# Patient Record
Sex: MALE | Race: WHITE | Employment: FULL TIME | URBAN - METROPOLITAN AREA
[De-identification: names, ages, dates, MRNs, and addresses within clinical notes are randomized per-mention and may not be internally consistent; named-entity substitution may affect disease eponyms.]

---

## 2017-03-20 ENCOUNTER — HOSPITAL ENCOUNTER (EMERGENCY)
Age: 42
Discharge: PSYCHIATRIC OR SUBSTANCE USE FACILITY | End: 2017-03-21
Attending: EMERGENCY MEDICINE | Admitting: EMERGENCY MEDICINE
Payer: COMMERCIAL

## 2017-03-20 DIAGNOSIS — F10.929 ALCOHOL INTOXICATION, WITH UNSPECIFIED COMPLICATION (HCC): ICD-10-CM

## 2017-03-20 DIAGNOSIS — T42.4X2A OVERDOSE OF BENZODIAZEPINE, INTENTIONAL SELF-HARM, INITIAL ENCOUNTER (HCC): Primary | ICD-10-CM

## 2017-03-20 LAB
ALBUMIN SERPL BCP-MCNC: 4.4 G/DL (ref 3.4–5)
ALBUMIN/GLOB SERPL: 1.4 {RATIO} (ref 0.8–1.7)
ALP SERPL-CCNC: 79 U/L (ref 45–117)
ALT SERPL-CCNC: 45 U/L (ref 16–61)
AMPHET UR QL SCN: NEGATIVE
ANION GAP BLD CALC-SCNC: 7 MMOL/L (ref 3–18)
APAP SERPL-MCNC: <2 UG/ML (ref 10–30)
AST SERPL W P-5'-P-CCNC: 27 U/L (ref 15–37)
BARBITURATES UR QL SCN: NEGATIVE
BASOPHILS # BLD AUTO: 0.1 K/UL (ref 0–0.06)
BASOPHILS # BLD: 1 % (ref 0–2)
BENZODIAZ UR QL: POSITIVE
BILIRUB SERPL-MCNC: 0.3 MG/DL (ref 0.2–1)
BUN SERPL-MCNC: 12 MG/DL (ref 7–18)
BUN/CREAT SERPL: 10 (ref 12–20)
CALCIUM SERPL-MCNC: 9 MG/DL (ref 8.5–10.1)
CANNABINOIDS UR QL SCN: NEGATIVE
CHLORIDE SERPL-SCNC: 107 MMOL/L (ref 100–108)
CO2 SERPL-SCNC: 28 MMOL/L (ref 21–32)
COCAINE UR QL SCN: NEGATIVE
CREAT SERPL-MCNC: 1.19 MG/DL (ref 0.6–1.3)
DIFFERENTIAL METHOD BLD: ABNORMAL
EOSINOPHIL # BLD: 0.5 K/UL (ref 0–0.4)
EOSINOPHIL NFR BLD: 7 % (ref 0–5)
ERYTHROCYTE [DISTWIDTH] IN BLOOD BY AUTOMATED COUNT: 12.5 % (ref 11.6–14.5)
ETHANOL SERPL-MCNC: 192 MG/DL (ref 0–3)
GLOBULIN SER CALC-MCNC: 3.2 G/DL (ref 2–4)
GLUCOSE SERPL-MCNC: 83 MG/DL (ref 74–99)
HCT VFR BLD AUTO: 48 % (ref 36–48)
HDSCOM,HDSCOM: ABNORMAL
HGB BLD-MCNC: 16.2 G/DL (ref 13–16)
LYMPHOCYTES # BLD AUTO: 43 % (ref 21–52)
LYMPHOCYTES # BLD: 3.1 K/UL (ref 0.9–3.6)
MCH RBC QN AUTO: 30 PG (ref 24–34)
MCHC RBC AUTO-ENTMCNC: 33.8 G/DL (ref 31–37)
MCV RBC AUTO: 88.9 FL (ref 74–97)
METHADONE UR QL: NEGATIVE
MONOCYTES # BLD: 0.4 K/UL (ref 0.05–1.2)
MONOCYTES NFR BLD AUTO: 5 % (ref 3–10)
NEUTS SEG # BLD: 3.2 K/UL (ref 1.8–8)
NEUTS SEG NFR BLD AUTO: 44 % (ref 40–73)
OPIATES UR QL: NEGATIVE
PCP UR QL: NEGATIVE
PLATELET # BLD AUTO: 298 K/UL (ref 135–420)
PMV BLD AUTO: 10.2 FL (ref 9.2–11.8)
POTASSIUM SERPL-SCNC: 4.4 MMOL/L (ref 3.5–5.5)
PROT SERPL-MCNC: 7.6 G/DL (ref 6.4–8.2)
RBC # BLD AUTO: 5.4 M/UL (ref 4.7–5.5)
SALICYLATES SERPL-MCNC: <2.8 MG/DL (ref 2.8–20)
SODIUM SERPL-SCNC: 142 MMOL/L (ref 136–145)
WBC # BLD AUTO: 7.3 K/UL (ref 4.6–13.2)

## 2017-03-20 PROCEDURE — 80307 DRUG TEST PRSMV CHEM ANLYZR: CPT | Performed by: EMERGENCY MEDICINE

## 2017-03-20 PROCEDURE — 93005 ELECTROCARDIOGRAM TRACING: CPT

## 2017-03-20 PROCEDURE — 96360 HYDRATION IV INFUSION INIT: CPT

## 2017-03-20 PROCEDURE — 74011250636 HC RX REV CODE- 250/636: Performed by: EMERGENCY MEDICINE

## 2017-03-20 PROCEDURE — 80053 COMPREHEN METABOLIC PANEL: CPT | Performed by: EMERGENCY MEDICINE

## 2017-03-20 PROCEDURE — 99285 EMERGENCY DEPT VISIT HI MDM: CPT

## 2017-03-20 PROCEDURE — 96361 HYDRATE IV INFUSION ADD-ON: CPT

## 2017-03-20 PROCEDURE — 85025 COMPLETE CBC W/AUTO DIFF WBC: CPT | Performed by: EMERGENCY MEDICINE

## 2017-03-20 RX ADMIN — SODIUM CHLORIDE 2000 ML: 900 INJECTION, SOLUTION INTRAVENOUS at 22:27

## 2017-03-20 RX ADMIN — SODIUM CHLORIDE 1000 ML: 900 INJECTION, SOLUTION INTRAVENOUS at 20:51

## 2017-03-20 NOTE — ED TRIAGE NOTES
[facility] Security wouldn't let him onto the gate. Patient is intoxicated and took a bottle full of xanax. Patient stated that he had a fight with his wife that led to this.

## 2017-03-20 NOTE — ED PROVIDER NOTES
HPI Comments: Mor Alfaro is a 46 y.o. Male who self reports taking od of xanax earlier at unknown time for undisclosed reason. Noted to be altered by ems. 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 Seen last year for violent outburst after alcohol intoxication. Pt not providing any additional information at this time    The history is provided by the patient and the EMS personnel. The history is limited by the condition of the patient. Past Medical History:   Diagnosis Date    Anxiety     Asthma        Past Surgical History:   Procedure Laterality Date    HX APPENDECTOMY      HX HEENT      tonsillectomy    HX HERNIA REPAIR           Family History:   Problem Relation Age of Onset    Heart Disease Maternal Grandmother        Social History     Social History    Marital status:      Spouse name: N/A    Number of children: N/A    Years of education: N/A     Occupational History    Not on file. Social History Main Topics    Smoking status: Former Smoker     Quit date: 8/11/2013    Smokeless tobacco: Never Used    Alcohol use 42.0 oz/week     70 Glasses of wine per week      Comment: Pt verbalized he is a functoning alcoholic     Drug use: No    Sexual activity: Yes     Partners: Female     Other Topics Concern    Not on file     Social History Narrative         ALLERGIES: Review of patient's allergies indicates no known allergies. Review of Systems   Unable to perform ROS: Mental status change       Vitals:    03/21/17 0345 03/21/17 0400 03/21/17 0415 03/21/17 0430   BP: 112/63 115/52 114/41 (!) 126/98   Pulse: 67 63 75 70   Resp: 12 10 15 10   SpO2: 94% 99% 100% 99%            Physical Exam   Constitutional: He is oriented to person, place, and time. Non-toxic appearance. He does not appear ill. No distress. HENT:   Head: Normocephalic and atraumatic.    Right Ear: External ear normal.   Left Ear: External ear normal.   Nose: Nose normal.   Mouth/Throat: Oropharynx is clear and moist. No oropharyngeal exudate. Eyes: Conjunctivae are normal.   Neck: Normal range of motion. Cardiovascular: Normal rate, regular rhythm, normal heart sounds and intact distal pulses. Pulmonary/Chest: Effort normal and breath sounds normal. No respiratory distress. Abdominal: Soft. There is no tenderness. Musculoskeletal: Normal range of motion. He exhibits no edema. Neurological: He is alert and oriented to person, place, and time. He has normal strength. No cranial nerve deficit (3-12). GCS eye subscore is 4. GCS verbal subscore is 5. GCS motor subscore is 6. Skin: Skin is warm and dry. He is not diaphoretic. Psychiatric:   Pt is calm, admit to self harm intent, but denies hallucinations. Speech is very sluggish, slow   Nursing note and vitals reviewed.        [facility]  ED Course       Procedures  Vitals:  Patient Vitals for the past 12 hrs:   Pulse Resp BP SpO2   03/21/17 0430 70 10 (!) 126/98 99 %   03/21/17 0415 75 15 114/41 100 %   03/21/17 0400 63 10 115/52 99 %   03/21/17 0345 67 12 112/63 94 %   03/21/17 0330 61 13 113/62 97 %   03/21/17 0315 68 14 128/61 100 %   03/21/17 0300 63 16 112/64 100 %   03/21/17 0245 66 20 116/54 100 %   03/21/17 0230 67 12 95/81 97 %   03/21/17 0215 66 15 - 100 %   03/21/17 0200 71 14 100/57 91 %   03/21/17 0145 64 13 95/46 100 %   03/21/17 0130 74 14 93/53 93 %   03/21/17 0115 75 14 105/59 96 %   03/21/17 0100 74 14 106/51 96 %   03/21/17 0045 74 14 106/53 95 %   03/21/17 0030 68 11 106/47 95 %   03/21/17 0015 71 15 105/62 94 %   03/20/17 2300 70 - 100/61 98 %   03/20/17 2218 69 16 96/54 96 %   03/20/17 2215 69 16 (!) 85/45 98 %   03/20/17 2200 73 17 97/53 96 %   03/20/17 2148 75 15 105/53 96 %   03/20/17 2145 75 15 (!) 78/38 94 %   03/20/17 2133 76 15 108/46 96 %   03/20/17 2130 76 16 109/53 95 %   03/20/17 2115 75 16 106/52 95 %   03/20/17 2100 76 16 105/52 95 %   03/20/17 2045 73 15 104/49 94 %   03/20/17 2030 72 12 114/63 97 %   03/20/17 2015 80 16 113/56 94 % 03/20/17 2000 82 16 116/61 94 %   03/20/17 1945 77 13 101/80 93 %         Medications ordered:   Medications   sodium chloride 0.9 % bolus infusion 1,000 mL (0 mL IntraVENous IV Completed 3/20/17 2227)   sodium chloride 0.9 % bolus infusion 2,000 mL (0 mL IntraVENous IV Completed 3/21/17 0006)         Lab findings:  Recent Results (from the past 12 hour(s))   EKG, 12 LEAD, INITIAL    Collection Time: 03/20/17  7:37 PM   Result Value Ref Range    Ventricular Rate 82 BPM    Atrial Rate 82 BPM    P-R Interval 176 ms    QRS Duration 92 ms    Q-T Interval 366 ms    QTC Calculation (Bezet) 427 ms    Calculated P Axis 44 degrees    Calculated R Axis -74 degrees    Calculated T Axis 25 degrees    Diagnosis       Normal sinus rhythm with sinus arrhythmia  Left axis deviation  Abnormal ECG  No previous ECGs available     CBC WITH AUTOMATED DIFF    Collection Time: 03/20/17  7:40 PM   Result Value Ref Range    WBC 7.3 4.6 - 13.2 K/uL    RBC 5.40 4.70 - 5.50 M/uL    HGB 16.2 (H) 13.0 - 16.0 g/dL    HCT 48.0 36.0 - 48.0 %    MCV 88.9 74.0 - 97.0 FL    MCH 30.0 24.0 - 34.0 PG    MCHC 33.8 31.0 - 37.0 g/dL    RDW 12.5 11.6 - 14.5 %    PLATELET 290 494 - 875 K/uL    MPV 10.2 9.2 - 11.8 FL    NEUTROPHILS 44 40 - 73 %    LYMPHOCYTES 43 21 - 52 %    MONOCYTES 5 3 - 10 %    EOSINOPHILS 7 (H) 0 - 5 %    BASOPHILS 1 0 - 2 %    ABS. NEUTROPHILS 3.2 1.8 - 8.0 K/UL    ABS. LYMPHOCYTES 3.1 0.9 - 3.6 K/UL    ABS. MONOCYTES 0.4 0.05 - 1.2 K/UL    ABS. EOSINOPHILS 0.5 (H) 0.0 - 0.4 K/UL    ABS.  BASOPHILS 0.1 (H) 0.0 - 0.06 K/UL    DF AUTOMATED     METABOLIC PANEL, COMPREHENSIVE    Collection Time: 03/20/17  7:40 PM   Result Value Ref Range    Sodium 142 136 - 145 mmol/L    Potassium 4.4 3.5 - 5.5 mmol/L    Chloride 107 100 - 108 mmol/L    CO2 28 21 - 32 mmol/L    Anion gap 7 3.0 - 18 mmol/L    Glucose 83 74 - 99 mg/dL    BUN 12 7.0 - 18 MG/DL    Creatinine 1.19 0.6 - 1.3 MG/DL    BUN/Creatinine ratio 10 (L) 12 - 20      GFR est AA >60 >60 ml/min/1.73m2    GFR est non-AA >60 >60 ml/min/1.73m2    Calcium 9.0 8.5 - 10.1 MG/DL    Bilirubin, total 0.3 0.2 - 1.0 MG/DL    ALT (SGPT) 45 16 - 61 U/L    AST (SGOT) 27 15 - 37 U/L    Alk. phosphatase 79 45 - 117 U/L    Protein, total 7.6 6.4 - 8.2 g/dL    Albumin 4.4 3.4 - 5.0 g/dL    Globulin 3.2 2.0 - 4.0 g/dL    A-G Ratio 1.4 0.8 - 1.7     ETHYL ALCOHOL    Collection Time: 03/20/17  7:40 PM   Result Value Ref Range    ALCOHOL(ETHYL),SERUM 192 (H) 0 - 3 MG/DL   ACETAMINOPHEN    Collection Time: 03/20/17  7:40 PM   Result Value Ref Range    ACETAMINOPHEN <2 (L) 10 - 30 ug/mL   SALICYLATE    Collection Time: 03/20/17  7:40 PM   Result Value Ref Range    SALICYLATE <9.6 (L) 2.8 - 20.0 MG/DL   DRUG SCREEN, URINE    Collection Time: 03/20/17  8:23 PM   Result Value Ref Range    BENZODIAZEPINE POSITIVE (A) NEG      BARBITURATES NEGATIVE  NEG      THC (TH-CANNABINOL) NEGATIVE  NEG      OPIATES NEGATIVE  NEG      PCP(PHENCYCLIDINE) NEGATIVE  NEG      COCAINE NEGATIVE  NEG      AMPHETAMINE NEGATIVE  NEG      METHADONE NEGATIVE       HDSCOM (NOTE)        EKG interpretation by ED Physician:  nsr with no acute st tw changes; Rate 82, qtc 427  No previous    Cardiac monitor: normal sinus.  No ectopy    X-Ray, CT or other radiology findings or impressions:  No orders to display       Progress notes, Consult notes or additional Procedure notes:   2:40 AM  More awake, but still somewhat sluggish  5:55 AM  Seen by telepsych, dr Sergio Brown who pt tells me rec admission, and pt is voluntary  D/w Richard Webber from [facility] who review chart    ED Critical Care Note    System at risk for life threatening failure: cardiac, renal, pulm, neuro  Associated problems: hypotension, alcohol intox, overdose    Critical Care services provided: bedside management, consult, documentation  Excluded procedures (time not included in critical care): ecg interp    Total Critical Care Time (in minutes) 38    Have made dr Vidal Cash aware pt is pending placement and my conversation with [facility]        Reevaluation of patient:   Stable  No acute medical condition that would prevent transfer to mental health facility    Disposition:  Diagnosis:   1. Overdose of benzodiazepine, intentional self-harm, initial encounter ([facility]    2. Alcohol intoxication, with unspecified complication ([facility]        Disposition: pending placement    Follow-up Information     None            Patient's Medications   Start Taking    No medications on file   Continue Taking    CYCLOBENZAPRINE (FLEXERIL) 10 MG TABLET    Take 1 Tab by mouth three (3) times daily as needed for Muscle Spasm(s). IBUPROFEN (MOTRIN) 800 MG TABLET    Take 1 Tab by mouth every six (6) hours as needed for Pain. MAGNESIUM GLUCONATE 500 MG (27 MG  ELEMENTAL) TABLET    Take  by mouth two (2) times a day. MULTIVITS/IRON FUM/FA/D3/LYCOP (MULTI FOR HIM PO)    Take  by mouth. SILDENAFIL CITRATE (VIAGRA) 50 MG TABLET    Take 1 Tab by mouth as needed. VILAZODONE (VIIBRYD) 40 MG TAB TABLET    Take  by mouth daily.    These Medications have changed    No medications on file   Stop Taking    No medications on file     s

## 2017-03-21 ENCOUNTER — HOSPITAL ENCOUNTER (INPATIENT)
Age: 42
LOS: 3 days | Discharge: HOME | DRG: 885 | End: 2017-03-24
Attending: PSYCHIATRY & NEUROLOGY | Admitting: PSYCHIATRY & NEUROLOGY
Payer: COMMERCIAL

## 2017-03-21 VITALS
OXYGEN SATURATION: 100 % | SYSTOLIC BLOOD PRESSURE: 112 MMHG | DIASTOLIC BLOOD PRESSURE: 71 MMHG | RESPIRATION RATE: 15 BRPM | HEART RATE: 62 BPM | TEMPERATURE: 97.2 F

## 2017-03-21 PROBLEM — F32.A DEPRESSION: Status: ACTIVE | Noted: 2017-03-21

## 2017-03-21 LAB
ATRIAL RATE: 82 BPM
CALCULATED P AXIS, ECG09: 44 DEGREES
CALCULATED R AXIS, ECG10: -74 DEGREES
CALCULATED T AXIS, ECG11: 25 DEGREES
DIAGNOSIS, 93000: NORMAL
P-R INTERVAL, ECG05: 176 MS
Q-T INTERVAL, ECG07: 366 MS
QRS DURATION, ECG06: 92 MS
QTC CALCULATION (BEZET), ECG08: 427 MS
VENTRICULAR RATE, ECG03: 82 BPM

## 2017-03-21 PROCEDURE — 65220000005 HC RM SEMIPRIVATE PSYCH 3 OR 4 BED

## 2017-03-21 PROCEDURE — 74011250637 HC RX REV CODE- 250/637: Performed by: PSYCHIATRY & NEUROLOGY

## 2017-03-21 RX ORDER — HALOPERIDOL 5 MG/ML
5 INJECTION INTRAMUSCULAR
Status: DISCONTINUED | OUTPATIENT
Start: 2017-03-21 | End: 2017-03-24 | Stop reason: HOSPADM

## 2017-03-21 RX ORDER — LORAZEPAM 1 MG/1
1-2 TABLET ORAL
Status: DISCONTINUED | OUTPATIENT
Start: 2017-03-21 | End: 2017-03-24 | Stop reason: HOSPADM

## 2017-03-21 RX ORDER — HALOPERIDOL 5 MG/1
5 TABLET ORAL
Status: DISCONTINUED | OUTPATIENT
Start: 2017-03-21 | End: 2017-03-24 | Stop reason: HOSPADM

## 2017-03-21 RX ORDER — ALPRAZOLAM 0.5 MG/1
0.5 TABLET ORAL
COMMUNITY
End: 2017-03-24

## 2017-03-21 RX ORDER — BENZTROPINE MESYLATE 1 MG/ML
1 INJECTION INTRAMUSCULAR; INTRAVENOUS
Status: DISCONTINUED | OUTPATIENT
Start: 2017-03-21 | End: 2017-03-24 | Stop reason: HOSPADM

## 2017-03-21 RX ORDER — TRAZODONE HYDROCHLORIDE 50 MG/1
50 TABLET ORAL
Status: DISCONTINUED | OUTPATIENT
Start: 2017-03-21 | End: 2017-03-22

## 2017-03-21 RX ORDER — LORAZEPAM 2 MG/ML
1-2 INJECTION INTRAMUSCULAR
Status: DISCONTINUED | OUTPATIENT
Start: 2017-03-21 | End: 2017-03-24 | Stop reason: HOSPADM

## 2017-03-21 RX ORDER — BENZTROPINE MESYLATE 1 MG/1
1 TABLET ORAL
Status: DISCONTINUED | OUTPATIENT
Start: 2017-03-21 | End: 2017-03-24 | Stop reason: HOSPADM

## 2017-03-21 RX ORDER — NALTREXONE HYDROCHLORIDE 50 MG/1
25 TABLET, FILM COATED ORAL DAILY
COMMUNITY
End: 2017-03-24

## 2017-03-21 RX ADMIN — LORAZEPAM 1 MG: 1 TABLET ORAL at 16:26

## 2017-03-21 RX ADMIN — TRAZODONE HYDROCHLORIDE 50 MG: 50 TABLET ORAL at 20:01

## 2017-03-21 NOTE — BH NOTES
Patient arrived on the unit from [place]. Patient was at [facility] ED after he was found attempting to OD on 30-45 0.5mg Xanax per patient. Patient states that he was fighting with his wife on the phone about money and he got upset and started drinking and then took about half a bottle of Xanax. Patient denies SI/HI and A/V/H. Patient states that he wasn't trying to kill himself and he wasn't attention seeking. Patient states \"if I need to put a label on it it would be self-destructive\". Patient has been oriented to the unit and given lunch. Patient is calm and cooperative but flat.

## 2017-03-21 NOTE — ED NOTES
Verbal shift change report given to Jerry Coleman RN (oncoming nurse) by Jeniffer Garcia (offgoing nurse). Report included the following information SBAR, ED Summary and MAR.

## 2017-03-21 NOTE — ED NOTES
7:28 AM  Assumed care of patient chart and labs reviewed awaiting possible disposition to [facility]  Visit Vitals    /53    Pulse (!) 56    Resp 16    SpO2 100%     Pt alert no distress at present voluntary for admission     9:56 AM  Pt accepted to [facility]     Diagnosis:   1. Overdose of benzodiazepine, intentional self-harm, initial encounter ([facility]    2. Alcohol intoxication, with unspecified complication ([facility]          Disposition: transfer    Follow-up Information     None          Patient's Medications   Start Taking    No medications on file   Continue Taking    CYCLOBENZAPRINE (FLEXERIL) 10 MG TABLET    Take 1 Tab by mouth three (3) times daily as needed for Muscle Spasm(s). IBUPROFEN (MOTRIN) 800 MG TABLET    Take 1 Tab by mouth every six (6) hours as needed for Pain. MAGNESIUM GLUCONATE 500 MG (27 MG  ELEMENTAL) TABLET    Take  by mouth two (2) times a day. MULTIVITS/IRON FUM/FA/D3/LYCOP (MULTI FOR HIM PO)    Take  by mouth. SILDENAFIL CITRATE (VIAGRA) 50 MG TABLET    Take 1 Tab by mouth as needed. VILAZODONE (VIIBRYD) 40 MG TAB TABLET    Take  by mouth daily.    These Medications have changed    No medications on file   Stop Taking    No medications on file

## 2017-03-21 NOTE — ED NOTES
Pt seems more lethargic. Increased force with sternal rub needed to arouse patient.  Dr. Lawanda Walters informed

## 2017-03-21 NOTE — CONSULTS
History of Present Illness: Pt is a 45 yo male who reports a hx of Depression and Anxiety. Pt presents to the ED, s/p intentional overdose on approximately 30-45 tabs of Xanax 0.5mg tabs. 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 He was found in his care at the [facility]. Pt took the remaining contents of his bottle of Xanax approximately 30-45 tabs. Pt states he took the medication after an argument with his wife regarding money. States his intent was not to kill himself but to get up the courage to get himself fired from his job. States I hate my life I hate my job.   States he also needs to get away from his wife as well. 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    He reports he has been depressed for a long time and that his depression is worse. States his triggers are his wife and job. States has a prior hx of multiple suicide attempts and inpt psychiatric admissions. On ROS pt denies AVHs, delusions nor current SI/HI. Pt is s/p intentional overdose on a large amount of Xanax. Pt presents as a danger to herself and requires acute inpt psychiatric admission for safety, stabilization and treatment. Pt is voluntary for inpt treatment. Inpt  multiple prior inpt admissions  Outpt  reports compliance  SAttempts   prior hx of multiple attempts  Family Psych Hx  not reported  Social Hx  , employed full time    Medical History: none acute reported  Substance Use Hx: ETOH  Medications & Freq: see chart    Allergies: NKDA  Mental Status Exam:   Appearance and attire: Dressed in hospital attire  Attitude and behavior: cooperative  Affect and mood: depressed/constricted  Association and thought processes: linear  Thought content: Denies delusions. Denies current SI/HI.   S/p intentional overdose  Perceptual: Denies AVHs  Sensorium, memory, and orientation AxOx3  Insight and judgment: poor/Impaired  Diagnosis: Unspecified Depressive Disorder, Alcohol Use Disorder  Treatment Recommendations:  Pt requires acute inpt psychiatric admission  For safety, stabilization and treatment  Pt is voluntary for inpt treatment

## 2017-03-21 NOTE — PROGRESS NOTES
Pt accepted at [facility] and accepting psychiatrist is Dr Yashira Restrepo. Pt is going to Adult Unit Room 126 Bed 4. Call report to [phone]. ED MD, ED RN and pt made aware.

## 2017-03-21 NOTE — ED NOTES
Patient pulling at leads and attempting to get up out of bed. Patient cooperative and stated \"I'm not trying to be difficult. I am just bored\"  Patient given a blanket and the head of bed put down and the lights dimmed and patient stated that he would try to go to sleep.

## 2017-03-21 NOTE — ED NOTES
Report called to [facility] psych and given to Marianna Bowman RN using SBAR. Pt is going to room 126, bed 4. Accepting MD is Dr. Lizeth Sue.

## 2017-03-21 NOTE — ED NOTES
Asked patient more about his intentions when taking the xanax. Patient stated \"I was not trying to hurt myself because you can't overdose on xanax. I just wanted to take enough so that I would get fired from my job because I hate my job\"     Noted pt not suicidal at this time.

## 2017-03-21 NOTE — IP AVS SNAPSHOT
Current Discharge Medication List  
  
START taking these medications Dose & Instructions Dispensing Information Comments Morning Noon Evening Bedtime ARIPiprazole 10 mg tablet Commonly known as:  ABILIFY Your last dose was: Your next dose is:    
   
   
 Dose:  10 mg Take 1 Tab by mouth daily for 30 days. Indications: DEPRESSION TREATMENT ADJUNCT, MIXED BIPOLAR I DISORDER Quantity:  30 Tab Refills:  0  
     
   
   
   
  
 traZODone 50 mg tablet Commonly known as:  Jorge Osborne Your last dose was: Your next dose is:    
   
   
 Dose:  50 mg Take 1 Tab by mouth nightly for 30 days. Indications: insomnia associated with depression Quantity:  30 Tab Refills:  0 CONTINUE these medications which have CHANGED Dose & Instructions Dispensing Information Comments Morning Noon Evening Bedtime * VIIBRYD 40 mg Tab tablet Generic drug:  vilazodone What changed:  Another medication with the same name was added. Make sure you understand how and when to take each. Your last dose was: Your next dose is: Take  by mouth daily. Refills:  0  
     
   
   
   
  
 * vilazodone 40 mg Tab tablet Commonly known as:  VIIBRYD What changed: You were already taking a medication with the same name, and this prescription was added. Make sure you understand how and when to take each. Your last dose was: Your next dose is:    
   
   
 Dose:  40 mg Take 1 Tab by mouth daily for 30 days. Indications: major depressive disorder Quantity:  30 Tab Refills:  0  
     
   
   
   
  
 * Notice: This list has 2 medication(s) that are the same as other medications prescribed for you. Read the directions carefully, and ask your doctor or other care provider to review them with you. STOP taking these medications   
 cyclobenzaprine 10 mg tablet Commonly known as:  FLEXERIL  
   
  
 ibuprofen 800 mg tablet Commonly known as:  MOTRIN  
   
  
 magnesium gluconate 500 mg (27 mg  elemental) tablet MULTI FOR HIM PO  
   
  
 naltrexone 50 mg tablet Commonly known as:  DEPADE  
   
  
 sildenafil citrate 50 mg tablet Commonly known as:  VIAGRA  
   
  
 XANAX 0.5 mg tablet Generic drug:  ALPRAZolam  
   
  
  
  
Where to Get Your Medications Information on where to get these meds will be given to you by the nurse or doctor. ! Ask your nurse or doctor about these medications ARIPiprazole 10 mg tablet  
 traZODone 50 mg tablet  
 vilazodone 40 mg Tab tablet

## 2017-03-21 NOTE — IP AVS SNAPSHOT
Brent Roman 
 
 
 [address] [place] [address] Patient: Mateusz Acevedo MRN: [id number] MFY:[birth year]78 You are allergic to the following No active allergies Immunizations Administered for This Admission Name Date Influenza Vaccine (Quad) PF  Deferred () Recent Documentation Height Weight BMI Smoking Status 1.803 m 83.9 kg 25.8 kg/m2 Former Smoker Emergency Contacts Name Discharge Info Relation Home Work Mobile [address] [facility]  Parent [1] [phone] About your hospitalization You were admitted on:  March 21, 2017 You last received care in the:  [facility] 1 ADULT CHEM DEP You were discharged on:  March 24, 2017 Unit phone number:  [phone] Why you were hospitalized Your primary diagnosis was:  Not on File Your diagnoses also included:  Depression Providers Seen During Your Hospitalizations Provider Role Specialty Primary office phone Gómez Staton MD Attending Provider Psychiatry [phone] Your Primary Care Physician (PCP) Primary Care Physician Office Phone Office Fax Radha Keane [phone] Follow-up Information Follow up With Details Comments Contact Info Tiffany Walsh MD   [address] [facility] [address] [facility] 
[phone] [facility]  Follow up appointment at [address] [place] [address] with Dr. Dany Garcia on April 12, 2017 at 2:10 pm for Medication Management. [address] [place] [address] [address] [facility] [address] [facility] Current Discharge Medication List  
  
START taking these medications Dose & Instructions Dispensing Information Comments Morning Noon Evening Bedtime ARIPiprazole 10 mg tablet Commonly known as:  ABILIFY Your last dose was:     
   
Your next dose is:    
   
   
 Dose:  10 mg  
 Take 1 Tab by mouth daily for 30 days. Indications: DEPRESSION TREATMENT ADJUNCT, MIXED BIPOLAR I DISORDER Quantity:  30 Tab Refills:  0  
     
   
   
   
  
 traZODone 50 mg tablet Commonly known as:  [place] Your last dose was: Your next dose is:    
   
   
 Dose:  50 mg Take 1 Tab by mouth nightly for 30 days. Indications: insomnia associated with depression Quantity:  30 Tab Refills:  0 CONTINUE these medications which have CHANGED Dose & Instructions Dispensing Information Comments Morning Noon Evening Bedtime * VIIBRYD 40 mg Tab tablet Generic drug:  vilazodone What changed:  Another medication with the same name was added. Make sure you understand how and when to take each. Your last dose was: Your next dose is: Take  by mouth daily. Refills:  0  
     
   
   
   
  
 * vilazodone 40 mg Tab tablet Commonly known as:  VIIBRYD What changed: You were already taking a medication with the same name, and this prescription was added. Make sure you understand how and when to take each. Your last dose was: Your next dose is:    
   
   
 Dose:  40 mg Take 1 Tab by mouth daily for 30 days. Indications: major depressive disorder Quantity:  30 Tab Refills:  0  
     
   
   
   
  
 * Notice: This list has 2 medication(s) that are the same as other medications prescribed for you. Read the directions carefully, and ask your doctor or other care provider to review them with you. STOP taking these medications   
 cyclobenzaprine 10 mg tablet Commonly known as:  FLEXERIL  
   
  
 ibuprofen 800 mg tablet Commonly known as:  MOTRIN  
   
  
 magnesium gluconate 500 mg (27 mg  elemental) tablet MULTI FOR HIM PO  
   
  
 naltrexone 50 mg tablet Commonly known as:  DEPADE  
   
  
 sildenafil citrate 50 mg tablet Commonly known as:  VIAGRA  
   
  
 XANAX 0.5 mg tablet Generic drug:  ALPRAZolam  
   
  
  
  
Where to Get Your Medications Information on where to get these meds will be given to you by the nurse or doctor. ! Ask your nurse or doctor about these medications ARIPiprazole 10 mg tablet  
 traZODone 50 mg tablet  
 vilazodone 40 mg Tab tablet Discharge Instructions BEHAVIORAL HEALTH NURSING DISCHARGE NOTE Emergency Numbers [address] [facility] Desk: [phone] [facility]: [phone] Suicide Prevention Line: [phone] (TALK) The following personal items collected during your admission are returned to you:  
Dental Appliance: Dental Appliances: None Vision:   
Hearing Aid:   
Jewelry: Jewelry: Watch (blkplastic-on-self) Clothing: Clothing: Footwear, Pants, Shirt, Undergarments, With patient Other Valuables: Other Valuables: None Valuables sent to safe: Personal Items Sent to Safe:  (none) The discharge information has been reviewed with the patient. The patient verbalized understanding. Spock Activation Thank you for requesting access to Spock. Please follow the instructions below to securely access and download your online medical record. Spock allows you to send messages to your doctor, view your test results, renew your prescriptions, schedule appointments, and more. How Do I Sign Up? 1. In your internet browser, go to [email or web address] 
2. Click on the First Time User? Click Here link in the Sign In box. You will be redirect to the New Member Sign Up page. 3. Enter your Spock Access Code exactly as it appears below. You will not need to use this code after youve completed the sign-up process. If you do not sign up before the expiration date, you must request a new code. Spock Access Code: [email or web address] Expires: 2017  7:35 PM (This is the date your Spock access code will ) 4. Enter the last four digits of your Social Security Number (xxxx) and Date of Birth (mm/dd/yyyy) as indicated and click Submit. You will be taken to the next sign-up page. 5. Create a [facility] ID. This will be your [facility] login ID and cannot be changed, so think of one that is secure and easy to remember. 6. Create a [facility] password. You can change your password at any time. 7. Enter your Password Reset Question and Answer. This can be used at a later time if you forget your password. 8. Enter your e-mail address. You will receive e-mail notification when new information is available in [address] 9. Click Sign Up. You can now view and download portions of your medical record. 10. Click the Download Summary menu link to download a portable copy of your medical information. Additional Information If you have questions, please visit the Frequently Asked Questions section of the [facility] website at [email or web address] Remember, [facility] is NOT to be used for urgent needs. For medical emergencies, dial 911. Patient armband removed and shredded Discharge Orders None [facility] Announcement We are excited to announce that we are making your provider's discharge notes available to you in [facility]. You will see these notes when they are completed and signed by the physician that discharged you from your recent hospital stay. If you have any questions or concerns about any information you see in [facility], please call the Health Information Department where you were seen or reach out to your Primary Care Provider for more information about your plan of care. Introducing [facility]! Kalyn Boyer introduces [facility] patient portal. Now you can access parts of your medical record, email your doctor's office, and request medication refills online. 1. In your internet browser, go to [email or web address] 2. Click on the First Time User? Click Here link in the Sign In box. You will see the New Member Sign Up page. 3. Enter your [facility] Access Code exactly as it appears below. You will not need to use this code after youve completed the sign-up process. If you do not sign up before the expiration date, you must request a new code. · [facility] Access Code: [email or web address] Expires: 6/18/2017  7:35 PM 
 
4. Enter the last four digits of your Social Security Number (xxxx) and Date of Birth (mm/dd/yyyy) as indicated and click Submit. You will be taken to the next sign-up page. 5. Create a [facility] ID. This will be your [facility] login ID and cannot be changed, so think of one that is secure and easy to remember. 6. Create a [facility] password. You can change your password at any time. 7. Enter your Password Reset Question and Answer. This can be used at a later time if you forget your password. 8. Enter your e-mail address. You will receive e-mail notification when new information is available in [address] 9. Click Sign Up. You can now view and download portions of your medical record. 10. Click the Download Summary menu link to download a portable copy of your medical information. If you have questions, please visit the Frequently Asked Questions section of the [facility] website. Remember, [facility] is NOT to be used for urgent needs. For medical emergencies, dial 911. Now available from your iPhone and Android! General Information Please provide this summary of care documentation to your next provider. Patient Signature:  ____________________________________________________________ Date:  ____________________________________________________________  
  
Jayme Moulding Provider Signature:  ____________________________________________________________ Date:  ____________________________________________________________

## 2017-03-21 NOTE — ED NOTES
Pt keeps making attempts to come out of bed. Pt spilled urinal all over floor. Housekeeping called. Explained to patient that he needs to stay in bed.

## 2017-03-21 NOTE — ED NOTES
Pt continues to try to get up out of bed. Explained to pt he needs to stay in bed. Patient verbalized understanding.

## 2017-03-21 NOTE — H&P
History and Physical        Patient: Benny Ruth               Sex: male          DOA: 3/21/2017         YOB: 1975      Age:  46 y.o.        LOS:  LOS: 0 days        HPI:     Benny Ruth is a 46 y.o. male who was admitted experiencing depression and suicidal ideation after attempting to overdose on medication. Active Problems:    Depression (3/21/2017)        Past Medical History:   Diagnosis Date    Anxiety     Asthma        Past Surgical History:   Procedure Laterality Date    HX APPENDECTOMY      HX HEENT      tonsillectomy    HX HERNIA REPAIR         Family History   Problem Relation Age of Onset    Heart Disease Maternal Grandmother        Social History     Social History    Marital status:      Spouse name: N/A    Number of children: 1    Years of education: GED     Social History Main Topics    Smoking status: Former Smoker     Quit date: 8/11/2013    Smokeless tobacco: Never Used    Alcohol use 42.0 oz/week     70 Glasses of wine per week      Comment: 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 States his appetite and sleep have been okay. States he is unemployed. Prior to Admission medications    Medication Sig Start Date End Date Taking? Authorizing Provider   naltrexone (DEPADE) 50 mg tablet Take 25 mg by mouth daily. Indications: ALCOHOLISM, dose unknown   Yes Historical Provider   ALPRAZolam (XANAX) 0.5 mg tablet Take 0.5 mg by mouth three (3) times daily as needed for Anxiety. Yes Historical Provider   vilazodone (VIIBRYD) 40 mg tab tablet Take  by mouth daily. Yes Historical Provider   sildenafil citrate (VIAGRA) 50 mg tablet Take 1 Tab by mouth as needed.  12/7/16   Sandria Curling, MD   ibuprofen (MOTRIN) 800 mg tablet Take 1 Tab by mouth every six (6) hours as needed for Pain. 11/13/13   Donell Canas MD   cyclobenzaprine (FLEXERIL) 10 mg tablet Take 1 Tab by mouth three (3) times daily as needed for Muscle Spasm(s). 11/13/13   Donell Canas MD   magnesium gluconate 500 mg (27 mg  elemental) tablet Take  by mouth two (2) times a day. Historical Provider   MULTIVITS/IRON FUM/FA/D3/LYCOP (MULTI FOR HIM PO) Take  by mouth. Historical Provider       No Known Allergies    Review of Systems  A comprehensive review of systems was negative. Physical Exam:      Visit Vitals    /72 (BP 1 Location: Right arm, BP Patient Position: Sitting)    Pulse 69    Temp 96.8 °F (36 °C)    Resp 16    Ht 5' 11\" (1.803 m)    Wt 185 lb (83.9 kg)    BMI 25.8 kg/m2       Physical Exam:    General:  Alert, cooperative, well developed, well nourished  male,  no distress, appears stated age. Eyes:  Conjunctivae/corneas clear. PERRL, EOMs intact. Fundi benign   Ears:  Normal TMs and external ear canals both ears. Nose: Nares normal. Septum midline. Mucosa normal. No drainage or sinus tenderness. Mouth/Throat: Lips, mucosa, and tongue normal. Teeth and gums normal.   Neck: Supple, symmetrical, trachea midline, no adenopathy, thyroid: no enlargement/tenderness/nodules, no carotid bruit and no JVD. Back:   Symmetric, no curvature. ROM normal. No CVA tenderness. Lungs:   Clear to auscultation bilaterally. Heart:  Regular rate and rhythm, S1, S2 normal, no murmur, click, rub or gallop. Abdomen:   Soft, non-tender. Bowel sounds normal. No masses,  No organomegaly. Extremities: Extremities normal, atraumatic, no cyanosis or edema. Pulses: 2+ and symmetric all extremities. Skin: Skin color, texture, turgor normal. No rashes or lesions   Lymph nodes: Cervical, supraclavicular, and axillary nodes normal.   Neurologic: CNII-XII intact. Normal strength, sensation and reflexes throughout.            Assessment/Plan     Depression  Suicidal ideation  Labs reviewed  Continue treatment per physician's orders

## 2017-03-21 NOTE — BSMART NOTE
OCCUPATIONAL THERAPY PROGRESS NOTE  Group Time:  1500  Attendance: The patient attended full group. Participation:  The patient participated fully in the activity. Attention:  The patient was able to focus on the activity. Interaction:  The patient occasionally  interacts with others. Patient came willingly to group. He said during introductions that healthy nutrition was hard for him because of erratic schedules. Patient was able to identify lifestyle changes that could benefit him in the long run, but, argued some of the fine points in the discussion.

## 2017-03-21 NOTE — BH NOTES
ANDRES Admission Note: Pt. Arrived on the unit ambulating with an admission person with the above pt. Pt was checked for contraband upon admission on the unit. Pt was given a copy of the rules upon admission.

## 2017-03-22 PROCEDURE — 65220000005 HC RM SEMIPRIVATE PSYCH 3 OR 4 BED

## 2017-03-22 PROCEDURE — 74011250637 HC RX REV CODE- 250/637: Performed by: PSYCHIATRY & NEUROLOGY

## 2017-03-22 RX ORDER — IBUPROFEN 400 MG/1
800 TABLET ORAL
Status: DISCONTINUED | OUTPATIENT
Start: 2017-03-22 | End: 2017-03-24 | Stop reason: HOSPADM

## 2017-03-22 RX ORDER — ARIPIPRAZOLE 10 MG/1
10 TABLET ORAL DAILY
Status: DISCONTINUED | OUTPATIENT
Start: 2017-03-22 | End: 2017-03-24 | Stop reason: HOSPADM

## 2017-03-22 RX ORDER — VILAZODONE HYDROCHLORIDE 10 MG/1
40 TABLET ORAL DAILY
Status: DISCONTINUED | OUTPATIENT
Start: 2017-03-22 | End: 2017-03-24 | Stop reason: HOSPADM

## 2017-03-22 RX ORDER — TRAZODONE HYDROCHLORIDE 50 MG/1
50 TABLET ORAL
Status: DISCONTINUED | OUTPATIENT
Start: 2017-03-22 | End: 2017-03-24 | Stop reason: HOSPADM

## 2017-03-22 RX ADMIN — TRAZODONE HYDROCHLORIDE 50 MG: 50 TABLET ORAL at 20:17

## 2017-03-22 RX ADMIN — ARIPIPRAZOLE 10 MG: 10 TABLET ORAL at 11:51

## 2017-03-22 RX ADMIN — LORAZEPAM 1 MG: 1 TABLET ORAL at 14:37

## 2017-03-22 RX ADMIN — LORAZEPAM 1 MG: 1 TABLET ORAL at 19:17

## 2017-03-22 RX ADMIN — VILAZODONE HYDROCHLORIDE 40 MG: 10 TABLET ORAL at 11:51

## 2017-03-22 NOTE — BSMART NOTE
ART THERAPY GROUP PROGRESS NOTE    Late entry note:1315    PATIENT SCHEDULED FOR GROUP AT: 4467    ATTENDANCE: Full    PARTICIPATION LEVEL: Participates fully in the art process    ATTENTION LEVEL: Able to focus on task    FOCUS: Problem-solving/ identify emotions    SYMBOLIC & THEMATIC CONTENT AS NOTED IN IMAGERY: he was calm, compliant, and invested in the task at hand. He expressed some guilt while describing his actions that brought him to the hospital. He claimed that he \"should not have handled [his] anger by drinking,\" however placed much blame on other's for his actions, claiming \"people just ignite the fire (referring to his anger) and I'm left with the consequences. \" Group discussed the inability to control another's words or actions, but the ability to control one's own.

## 2017-03-22 NOTE — BH NOTES
GROUP THERAPY PROGRESS NOTE    Bud Quispe is participating in Stress Management. Group time: 30 minutes    Personal goal for participation: Stress Relief    Goal orientation: community    Group therapy participation: active    Therapeutic interventions reviewed and discussed: \"Hunting for the Good\"-Turning a negative situation into a positive. Impression of participation: Pt fully participated in group therapy. Pt stated that earlier during the day, his wife would not take phone calls from him, but now they engage in a conversation without yelling and screaming at each other. Pt states that she's a major part of why he's here at the facility, but he's learning how to release stress by exercising and not resorting to violence.

## 2017-03-22 NOTE — BH NOTES
Please find rounds paperwork in chart as per [facility] downtime. The documentation for this period is being entered following the guidelines as defined in the [facility] downtime policy by Swathi Cyr RN.

## 2017-03-22 NOTE — BH NOTES
GROUP THERAPY PROGRESS NOTE    Luciana Wilcox is participating in Recreational Therapy. Group time: 30 minutes    Personal goal for participation: fresh air break/games on the unit    Goal orientation: social    Group therapy participation: active    Therapeutic interventions reviewed and discussed: Staff encouraged Pt to get off the unit and go outside and get some fresh air, or play games on the unit with peers. Impression of participation: Pt chose to go off the unit to the recreation room, get fresh air, and playing games with peers.

## 2017-03-22 NOTE — BH NOTES
SW Contact:  Pt. Is a 46year old male with history of depression and anxiety. Pt. has previous psych admissions. Pt. Was admitted to this facility for alcohol intoxication a and overdose on Xanax. SW met with  pt. To discuss the reason for this admission. Pt stated he has been feeling depressed. Pt. admits to having martial issues and does not like his job. Pt. Stated he and his spouse had an argument over finances. Pt. Stated after the argument , he decided to drink. Pt stated he than attempted to drive thru the gate at work. Pt. Expressed he was hoping to get fired. SW allowed pt to event. Pt stated he and his spouse are currently going thru mediation. Pt stated he does not want a divorce. Pt. admits to having a lot of insecurities in his marriage. Pt. Admits drinking has been issue for him since age 17. Pt. Stated both parents were alcoholics. Pt admits he was doing well on medications. The medications  Helped to  deter the alcohol craving. The pt. Stated after the argument with his spouse, he decided to relapse. SW discussed relapse prevention such as IOP or  residential rehab, positive conflict resolution, self-efficacy, safety plan, positive thoughts to counteract negative thinking. Pt. Stated he receives outpt mental health services with [facility].  Pt. plans to return home after d/c.

## 2017-03-22 NOTE — BSMART NOTE
OCCUPATIONAL THERAPY PROGRESS NOTE  Group Time:  4573  Attendance: The patient attended full group. Participation:  The patient participated with moderate elaboration in the activity. Attention:  The patient was able to focus on the activity. Interaction:  The patient occasionally  interacts with others. Somewhat guarded and generalized in responses. Did discuss drinking and his tendency to have trouble when he does, states he has only had drink 3 times since January and feels he needs to stop drinking.

## 2017-03-22 NOTE — BH NOTES
Patient has been cooperative and pleasant during this nurse's shift. Patient has taken all medications as prescribed and on schedule and has not required PRN medications. Patient has eaten all meals and snacks and agrees to come to staff if feelings of self harm or harm to others arise. Patient attended groups and activities and was active participant. Patient consistently wears non-skid footwear while ambulating and room is free of clutter. Will continue to monitor and provide safe and therapeutic environment.

## 2017-03-22 NOTE — BH NOTES
GROUP THERAPY PROGRESS NOTE    Isabel Batres is participating in [place].      Group time: 20 minutes    Personal goal for participation: talk w/md    Goal orientation:community     Group therapy participation: active    Therapeutic interventions reviewed and discussed: unit rules and guidelines

## 2017-03-22 NOTE — H&P
BEHAVIORAL HEALTH ADMISSION NOTES    Patient: Aurelio Arellano               Sex: male          DOA: 3/21/2017       YOB: 1975      Age:  46 y.o. 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 Per chart patient reports prior SAs (last SA was 2/2016) and multiple inpatient psychiatric hospitalization (last admission was 2/2016). Per chart patient self-report that he intentionally OD on 30-40x of Xanax 0.5 mg with alcohol in order to kill himself. On this admission patient was admitted under volunteer status for worsening depression and post SA via medications. Patient stated he took the medication after an argument with his wife regarding money. He stated his intent was not to kill himself but to get enough courage to get himself fired from his job. He stated, I hate my life I hate my job.   And he also stated he needs to get away from his wife as well. 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   Patient reports that he wants to get fired so that his wife cant ask him for any more money. Patient denies having any A/V/H, delusion, or current SI/HI. Patient states that he wasn't trying to kill himself and he wasn't attention seeking. Patient states \"if I need to put a label on it, it would be self-destructive\". At present, patient reports having manic-depressive symptoms but NOT psychotic. He denies all neurovegetative symptoms. He is willing to comply with the recommended treatment plan in order to improve his psychiatric symptoms.      Active Problems:    Depression (3/21/2017)         Past Medical History:   Diagnosis Date    Anxiety     Asthma         Past Surgical History:   Procedure Laterality Date    HX APPENDECTOMY      HX HEENT      tonsillectomy    HX HERNIA REPAIR         Social History   Substance Use Topics    Smoking status: Former Smoker     Quit date: 8/11/2013    Smokeless tobacco: Never Used    Alcohol use 42.0 oz/week     70 Glasses of wine per week      Comment: Pt verbalized he is a functoning alcoholic         Family History   Problem Relation Age of Onset    Heart Disease Maternal Grandmother         No Known Allergies     Prior to Admission medications    Medication Sig Start Date End Date Taking? Authorizing Provider   naltrexone (DEPADE) 50 mg tablet Take 25 mg by mouth daily. Indications: ALCOHOLISM, dose unknown   Yes Historical Provider   ALPRAZolam (XANAX) 0.5 mg tablet Take 0.5 mg by mouth three (3) times daily as needed for Anxiety. Yes Historical Provider   vilazodone (VIIBRYD) 40 mg tab tablet Take  by mouth daily. Yes Historical Provider   sildenafil citrate (VIAGRA) 50 mg tablet Take 1 Tab by mouth as needed. 12/7/16   Yaron Preston MD   ibuprofen (MOTRIN) 800 mg tablet Take 1 Tab by mouth every six (6) hours as needed for Pain. 11/13/13   Yaron Preston MD   cyclobenzaprine (FLEXERIL) 10 mg tablet Take 1 Tab by mouth three (3) times daily as needed for Muscle Spasm(s). 11/13/13   Yaron Preston MD   magnesium gluconate 500 mg (27 mg  elemental) tablet Take  by mouth two (2) times a day. Historical Provider   MULTIVITS/IRON FUM/FA/D3/LYCOP (MULTI FOR HIM PO) Take  by mouth.     Historical Provider       VITALS:    Visit Vitals    /65 (BP 1 Location: Right arm, BP Patient Position: Sitting)    Pulse 68    Temp 97.7 °F (36.5 °C)    Resp 16    Ht 5' 11\" (1.803 m)    Wt 83.9 kg (185 lb)    BMI 25.8 kg/m2       Labs: Reviewed and in chart  PSYCHIATRIC HISTORY:  DIAGNOSIS: depression and anxiety  CURRENT PSYCHIATRIST: NIDIA  THERAPIST: NIDIA  ADMISSIONS: multiple inpatient psychiatric hospitalization (last admission was 2/2016)   SUICIDE ATTEMPTS: prior SAs (last SA was 2/2016)       REVIEW OF SYSTEMS:     GENERAL:Patient alert, awake and oriented times 3, able to communicate full sentences and not in distress. HEENT: No change in vision, no earache, tinnitus, sore throat or sinus congestion. NECK: No pain or stiffness. PULMONARY: No shortness of breath, cough or wheeze. GASTROINTESTINAL: No abdominal pain, nausea, vomiting or diarrhea, melena or bright red blood per rectum. GENITOURINARY: No urinary frequency, urgency, hesitancy or dysuria. MUSCULOSKELETAL: No joint or muscle pain, no back pain, no recent trauma. DERMATOLOGIC: No rash, no itching, no lesions. ENDOCRINE: No polyuria, polydipsia, no heat or cold intolerance. No recent change in weight. HEMATOLOGICAL: No anemia or easy bruising or bleeding. \NEUROLOGIC: No headache, seizures, numbness, tingling or weakness. \denies f/c, pain, n/v, d/c, SOB, CP, weakness/numbness, difficulty urinating    MINI MENTAL STATUS EXAM: :   Orientation- Oriented in all spheres  Short-term memory: shows no evidence of impairment  Attention:Normal  Repeat phrase \"no ifs, ands, or buts. \"  Follow three stage command- follow written command (CLOSE YOUR EYES)\- write a spontaneous sentence  Copy a simple design      MENTAL STATUS EXAM:  Appearance:shows no evidence of impairment  Behavior: shows no evidence of impairment  Motor: within normal limits  Speech: is slowed and is soft  Mood: anxious and depressed  Affect: anxious and depressed  Thought Process: shows no evidence of impairment  Thought Content: no evidence of impairment  Perception:None elicited  Cognition:  appropriate decision making  Insight: The patient's insight is blaming  Judgment: is psychologically impaired    RISK ASSESSMENT:   Prior Attempts: YES  Lethality of Attempts: YES  Weapons at [employer]  Safe at Home: NO  Alcohol/Drug Use: NO  Protective Factors:contacts reliable for safety, denies intent, no organized plan and no means/access to weapons      ASSESSMENT: The patient is a 46years old , 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 Per chart patient reports prior SAs (last SA was 2/2016) and multiple inpatient psychiatric hospitalization (last admission was 2/2016). Per chart patient self-report that he intentionally OD on 30-40x of Xanax 0.5 mg with alcohol in order to kill himself. On this admission patient was admitted under volunteer status for worsening depression and post SA via medications. Patient stated he took the medication after an argument with his wife regarding money. 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   Patient reports that he wanted to get fired so that his wife cant ask him for any more money. Patient denies having any A/V/H, delusion, or current SI/HI. Patient states that he wasn't trying to kill himself and he wasn't attention seeking. Patient states \"if I need to put a label on it, it would be self-destructive\". Axis I: Unspecified Depressive Disorder, Alcohol Use Disorder  Axis II: Deferred  Axis II: Muscle Spasm  Axis IV: Hate life and job  Axis V: GAF: 30     Plan:  1. Continue with inpatient psychiatric treatment  2. Continue with suicide or assault precautions  3. Patient is to continue with Art/OT and family therapy sessions  4. Will need to talk with outpatient psychiatrist/therapist for more collateral  5. Treatment plan: Restart all home medical medications and consult medicine if possible.  -Patient voices understanding of the risk, benefit, alternative treatment, and the risk of no treatment.   -Patient consents and willing to comply with treatment.    -Psychotropic medications:  -1. Switch: Viibryd 40 mg PO daily give w/ food   -2. Start: Abilify 10 mg PO qdaily  -3. Start: Trazodone 50 mg PO qHS     6. Labs: None necessary at this time  7. SW to help with disposition    EST LOS: 3-5 DAYS.      Disposition:  Home w/Family           ___________________________________________________    Attending Physician: Leatha Isidro MD     ALLERGIES: No Known Allergies    SUBSTANCE USE:alcohol    Patient Vitals for the past 24 hrs:   Temp Pulse Resp BP   03/22/17 0753 97.7 °F (36.5 °C) 68 16 122/65   03/21/17 1252 96.8 °F (36 °C) 69 16 108/72

## 2017-03-22 NOTE — BH NOTES
Up at will on unit. Medication compliant. Contracts for safety. Denies thoughts of harm to self or others   Attended group. Will continue to monitor.

## 2017-03-23 PROCEDURE — 74011250637 HC RX REV CODE- 250/637: Performed by: PSYCHIATRY & NEUROLOGY

## 2017-03-23 PROCEDURE — 65220000001 HC RM PRIVATE PSYCH

## 2017-03-23 RX ADMIN — TRAZODONE HYDROCHLORIDE 50 MG: 50 TABLET ORAL at 21:05

## 2017-03-23 RX ADMIN — VILAZODONE HYDROCHLORIDE 40 MG: 10 TABLET ORAL at 08:23

## 2017-03-23 RX ADMIN — ARIPIPRAZOLE 10 MG: 10 TABLET ORAL at 08:23

## 2017-03-23 RX ADMIN — LORAZEPAM 1 MG: 1 TABLET ORAL at 12:22

## 2017-03-23 RX ADMIN — LORAZEPAM 1 MG: 1 TABLET ORAL at 18:55

## 2017-03-23 NOTE — PROGRESS NOTES
Problem: Suicide/Homicide (Adult/Pediatric)  Goal: *STG: Remains safe in hospital  Pt will not engage in any self injurious behaviors while hospitalized   Outcome: Progressing Towards Goal  Patient was actively involved in recreation/groups. Goal: *STG/LTG: Complies with medication therapy  Pt will comply with prescribed medications daily   Patient taking medication as prescribed. Problem: Depressed Mood (Adult/Pediatric)  Goal: *STG: Participates in treatment plan  Pt will actively participate in scheduled groups and daily assessments   Outcome: Progressing Towards Goal  Patient has been participating appropriately. Comments:   Patient has been social in the day area with other peers. He did go down for recreation. Patient had concern about a medication that he takes at home that he has not been prescribed to take while in the hospital.  He expressed that the doctor told him that it would be ordered. Encouraged patient to speak with the physician about it in the morning. Patient ate snack and took all prescribed medication without difficulty.

## 2017-03-23 NOTE — BH NOTES
Patient asked for something for anxiety because he tried to phone his wife and couldn't get her on the phone. Patient administered 1mg of Ativan.

## 2017-03-23 NOTE — PROGRESS NOTES
Problem: Suicide/Homicide (Adult/Pediatric)  Goal: *STG/LTG: Complies with medication therapy  Pt will comply with prescribed medications daily   Outcome: Progressing Towards Goal  Patient has been compliant with prescribed medication. Goal: *STG/LTG: No longer expresses self destructive or suicidal/homicidal thoughts  Pt will deny SI on daily RN assessment   Outcome: Progressing Towards Goal  Patient has verbalized denial of suicidal ideation. Comments:   Patient has been calm and cooperative this shift with medication compliance. Patient has bright affect with patient stating his mood is improving. Patient denies suicidal ideation with no safety issues noted. Patient has been open to 1:1 and stated he will discuss discharge options with MD when he is evaluated. Patient has showered and completed daily hygiene care. Patient reported eating 100% of meals and stated he is sleeping well throughout the night. Will continue to monitor for safety with support as needed the night. Will continue to monitor for safety with support as needed throughout treatment regimen.

## 2017-03-23 NOTE — BH NOTES
GROUP THERAPY PROGRESS NOTE    Florentin Veronicaangel is participating in [place]. Group time: 30 minutes    Personal goal for participation: I want to stop drinking    Goal orientation: personal    Group therapy participation: active and minimal    Therapeutic interventions reviewed and discussed: He was educated on AA/NA and also attending meetings to help him with his self-medication during this group. He was also educated on literature and he was given a meeting list to help him upon discharge. He was receptive to this information during group. Impression of participation: He was alert and oriented during group he focused on his stressors were his wife and self-medication during this group with staff and peers. He appeared to touch a little on his marital stressors with his wife and she being his trigger during this conversation.

## 2017-03-23 NOTE — BH NOTES
GROUP THERAPY PROGRESS NOTE    Refugio Restrepo is participating in Recreational Therapy.      Group time: 30 minutes    Personal goal for participation: have fun and relax    Goal orientation: relaxation    Group therapy participation: active    Therapeutic interventions reviewed and discussed: Encouraged by staff to spend some time playing games off the unit and enjoy time    Impression of participation: Pt enjoyed his time off the unit and played games

## 2017-03-23 NOTE — PROGRESS NOTES
Behavioral Health Progress Note      3/23/2017    Asif Corbin    Current Diagnosis: Severe depression and Post SA via medications on BZD    Report from the nursing staff changes in the medical condition while patient has been on the unit:    Patient Vitals for the past 24 hrs:   Temp Pulse Resp BP   03/23/17 0941 97.5 °F (36.4 °C) 81 18 121/82       No results found for this or any previous visit (from the past 24 hour(s)). Sleep:shows no evidence of impairment    Interval history: Patient hasn't posed a management problem since admission. He reports that he will take responsibility for his own action and stop blame others. He reports having time to cope and feeling better. He has been compliant w/ meds and w/ meals w/o any SE reported or observed. NO neurovegetative si/x. Mental Status Exam: Affect/mood are brighter. Insight/judgment are improving. Treatment Plan:  -1. Continue: Viibryd 40 mg PO daily give w/ food   -2. Continue: Abilify 10 mg PO qdaily  -3.  Continue: Trazodone 50 mg PO qHS     Anticipated Discharge: Rene: 3/24/2017    Tanja Figueroa MD  3/23/2017

## 2017-03-23 NOTE — BH NOTES
GROUP THERAPY PROGRESS NOTE    Luciana Wilcox is participating in [address] Group    Group time: 1hour  Personal goal for participation:  Seek information on Alcohol      Goal orientation: active  Group therapy participation:   Fully participated    Therapeutic interventions reviewed and discussed: Staff encouraged Pt. To participate in Group    Impression of participation:  [address] [facility] [address] members reviewed a film, then held an open discussion with Pt.  Pt. Santi Sheppard and received feedback while in group

## 2017-03-23 NOTE — BH NOTES
ANDRES Note: The above pt was given literature on AA/na on this shift he was educated on the literature and he was very grateful about this literature at this time.

## 2017-03-23 NOTE — BH NOTES
Thais Christian is  participating in [place]. Group time: 15 minutes    Personal goal for participation: Community announcement    Goal orientation: community    Group therapy participation: fully participated    Therapeutic interventions reviewed and discussed: Staff encouraged Pt. To report any maintenance/housekeeping or community concerns to staff so it can be addressed. Impression of participation: Pt. Did not have any maintenance/housekeeping or community concerns to report to staff .

## 2017-03-23 NOTE — BSMART NOTE
OCCUPATIONAL THERAPY PROGRESS NOTE  Group Time:  [id number]  Attendance: The patient attended full group. Participation:  The patient participated with moderate elaboration in the activity. .  Attention:  The patient was able to focus on the activity. Interaction:  The patient frequently interacts with others. Able to generally ID wanting to distract self from drinking through exercise, chores, being with son. Plans vague. States he began working out too much before and encouraged to develop a safe/healthy routine. Unclear if actually motivated to do anything different and hopes the naltrexone will solve the issues and cravings.

## 2017-03-23 NOTE — BSMART NOTE
ART THERAPY GROUP PROGRESS NOTE    PATIENT SCHEDULED FOR GROUP AT: 14:30    ATTENDANCE: Full    PARTICIPATION LEVEL: Participates fully in the art process    ATTENTION LEVEL: Able to focus on task    FOCUS: Support    SYMBOLIC & THEMATIC CONTENT AS NOTED IN IMAGERY: He was calm and presented with a slightly brighter affect than noted when last seen for group art therapy (3/21/17). He was invested in the task at hand and actively participated in group discussion. He spoke a great deal of his five-year-old son being his biggest support and motive. He was able to identify several means of internal support, however had difficulty identifying external resources that would count as supports.

## 2017-03-23 NOTE — BH NOTES
MHT Notes: The Pt was present in the day area all shift. The Pt interacted with his peers and staff with no problem. The Pt did eat his meal and snack. The Pt attended and participated in all groups during the shift. The Pt did not have any visitors, but was seen on the phone several times during the shift. The Pt denied any SI/HI or AH/VH. The Pt does contracts or safety.   The Pt was reminded to keep socks and/or shoes on his feet to avoid slips and/or falls

## 2017-03-24 VITALS
RESPIRATION RATE: 18 BRPM | WEIGHT: 185 LBS | BODY MASS INDEX: 25.9 KG/M2 | SYSTOLIC BLOOD PRESSURE: 116 MMHG | TEMPERATURE: 97 F | DIASTOLIC BLOOD PRESSURE: 74 MMHG | HEART RATE: 81 BPM | HEIGHT: 71 IN

## 2017-03-24 PROCEDURE — 74011250637 HC RX REV CODE- 250/637: Performed by: PSYCHIATRY & NEUROLOGY

## 2017-03-24 RX ORDER — ARIPIPRAZOLE 10 MG/1
10 TABLET ORAL DAILY
Qty: 30 TAB | Refills: 0 | Status: SHIPPED | OUTPATIENT
Start: 2017-03-24 | End: 2017-04-23

## 2017-03-24 RX ORDER — TRAZODONE HYDROCHLORIDE 50 MG/1
50 TABLET ORAL
Qty: 30 TAB | Refills: 0 | Status: SHIPPED | OUTPATIENT
Start: 2017-03-24 | End: 2017-04-23

## 2017-03-24 RX ORDER — VILAZODONE HYDROCHLORIDE 40 MG/1
40 TABLET ORAL DAILY
Qty: 30 TAB | Refills: 0 | Status: SHIPPED | OUTPATIENT
Start: 2017-03-24 | End: 2017-04-23

## 2017-03-24 RX ADMIN — ARIPIPRAZOLE 10 MG: 10 TABLET ORAL at 08:29

## 2017-03-24 RX ADMIN — VILAZODONE HYDROCHLORIDE 40 MG: 10 TABLET ORAL at 08:29

## 2017-03-24 NOTE — BH NOTES
GROUP THERAPY PROGRESS NOTE    Leobardo Reagan is participating in [place]. Group time: 35 minutes    Personal goal for participation: I am ready to get discharged    Goal orientation: personal    Group therapy participation: active    Therapeutic interventions reviewed and discussed: He was educated on AA/NA groups and also taking his medications at this time. Impression of participation: He was alert and oriented during group he focused on him getting discharge and not self-medicating after discharge.

## 2017-03-24 NOTE — BH NOTES
Isabel Aba is  participating in [place]. Group time: 15 minutes    Personal goal for participation: Community announcement    Goal orientation: community    Group therapy participation: fully participated    Therapeutic interventions reviewed and discussed: Staff encouraged Pt. To report any maintenance/housekeeping or community concerns to staff so it can be addressed. Impression of participation: Pt. Did not have any maintenance/housekeeping or community concerns to report to staff .

## 2017-03-24 NOTE — DISCHARGE SUMMARY
[facility]  Discharge Summary     Patient ID:  Kuldip Josha  [id number]  58 y.o.  1975    Admit date: 3/21/2017    Discharge date and time: 3/24/2017 and august    Admission Diagnoses: Depression  Depression    Discharge Diagnoses: MDD R/S w/o P/F, Anxiety due to depression     Disposition: home    Discharged Condition: good    Past Medical History:   Diagnosis Date    Anxiety     Asthma       Family History   Problem Relation Age of Onset    Heart Disease Maternal Grandmother       Social History   Substance Use Topics    Smoking status: Former Smoker     Quit date: 8/11/2013    Smokeless tobacco: Never Used    Alcohol use 42.0 oz/week     70 Glasses of wine per week      Comment: Pt verbalized he is a functoning alcoholic      Past Surgical History:   Procedure Laterality Date    HX APPENDECTOMY      HX HEENT      tonsillectomy    HX HERNIA REPAIR        Prior to Admission medications    Medication Sig Start Date End Date Taking? Authorizing Provider   naltrexone (DEPADE) 50 mg tablet Take 25 mg by mouth daily. Indications: ALCOHOLISM, dose unknown   Yes Historical Provider   ALPRAZolam (XANAX) 0.5 mg tablet Take 0.5 mg by mouth three (3) times daily as needed for Anxiety. Yes Historical Provider   vilazodone (VIIBRYD) 40 mg tab tablet Take  by mouth daily. Yes Historical Provider   sildenafil citrate (VIAGRA) 50 mg tablet Take 1 Tab by mouth as needed. 12/7/16   John West MD   ibuprofen (MOTRIN) 800 mg tablet Take 1 Tab by mouth every six (6) hours as needed for Pain. 11/13/13   John West MD   cyclobenzaprine (FLEXERIL) 10 mg tablet Take 1 Tab by mouth three (3) times daily as needed for Muscle Spasm(s). 11/13/13   John West MD   magnesium gluconate 500 mg (27 mg  elemental) tablet Take  by mouth two (2) times a day. Historical Provider   MULTIVITS/IRON FUM/FA/D3/LYCOP (MULTI FOR HIM PO) Take  by mouth.     Historical Provider     No Known Allergies     Hospital course: The patient was admitted to the special treatment unit on 3/21/2017. The patient was engaged in individual and group therapies, and occupational therapy. The patient was involved in chemical dependence educational classes and NA/AA. During hospitalization patient posed NO management problems. Patient was compliant w/ meds and w/ meals w/o any SE reported or observed. Initially patient affect and mood were flat, anxious, and depressed but once medications were started and he started to attend psychotherapy group sessions his mood was stabilized. Patient reports that the current treatment regimen is effective at controlling his psychiatric symptoms. At the time of discharge, the patient denied homicidal or suicidal ideation. Patient was not psychotic and was capable of self-care and competent to make their own financial and medical decisions. The patient has an understanding of treatment recommendations and medication management on discharge. Discharge Exams:   Mental Status exam: WNL   Physical exam: No exam performed today, NO physical complaints reported. Chest X-Ray: None  ECG: None    Lab/Data Review: All lab results for the last 24 hours reviewed. Consultations (including impressions and outcomes): None necessary  Psychiatric Testing: Reality based  Treatment and Response: Effective  Significant adverse reaction to drugs: none  Procedures/Operations: none  Aftercare safety treatment plan was discussed with the patient before discharge. Discharge medications:    1. Abilify 10 mg PO daily  2. Trazodone 50 mg PO qHS  3. Viibryd 40 mg PO daily    Activity: Activity as tolerated  Diet: Regular Diet    All f/u were arranged for the patient by the discharge planner at the time of discharge.     Signed:  Shira Calle MD  3/24/2017  9:00 AM

## 2017-03-24 NOTE — DISCHARGE INSTRUCTIONS
BEHAVIORAL HEALTH NURSING DISCHARGE NOTE      Emergency Numbers    : [facility]: [phone]  Suicide Prevention Line: [phone] (TALK)      The following personal items collected during your admission are returned to you:   Dental Appliance: Dental Appliances: None  Vision:    Hearing Aid:    Jewelry: Jewelry: Watch (blkplastic-on-self)  Clothing: Clothing: Footwear, Pants, Shirt, Undergarments, With patient  Other Valuables: Other Valuables: None  Valuables sent to safe: Personal Items Sent to Safe:  (none)        The discharge information has been reviewed with the patient. The patient verbalized understanding. Networker Activation    Thank you for requesting access to Network[employer]. Please follow the instructions below to securely access and download your online medical record. Networker allows you to send messages to your doctor, view your test results, renew your prescriptions, schedule appointments, and more. How Do I Sign Up? 1. In your internet browser, go to [email or web address]  2. Click on the First Time User? Click Here link in the Sign In box. You will be redirect to the New Member Sign Up page. 3. Enter your Networker Access Code exactly as it appears below. You will not need to use this code after youve completed the sign-up process. If you do not sign up before the expiration date, you must request a new code. Networker Access Code: [email or web address]  Expires: 2017  7:35 PM (This is the date your Networker access code will )    4. Enter the last four digits of your Social Security Number (xxxx) and Date of Birth (mm/dd/yyyy) as indicated and click Submit. You will be taken to the next sign-up page. 5. Create a Networker ID. This will be your Networker login ID and cannot be changed, so think of one that is secure and easy to remember. 6. Create a Networker password. You can change your password at any time.   7. Enter your Password Reset Question and Answer. This can be used at a later time if you forget your password. 8. Enter your e-mail address. You will receive e-mail notification when new information is available in [address] 9. Click Sign Up. You can now view and download portions of your medical record. 10. Click the Download Summary menu link to download a portable copy of your medical information. Additional Information    If you have questions, please visit the Frequently Asked Questions section of the [employer] website at [email or web address] Remember, [employer] is NOT to be used for urgent needs. For medical emergencies, dial 911.     Patient armband removed and shredded

## 2017-03-24 NOTE — BH NOTES
Patient has been discharged to self and will follow up with [facility] Ady Dowell. Patient has been provided with scripts and follow up appointment in place. Patient has been educated regarding seeking additional support if needed with information listed on discharge paper work and additional emergency card provided. Patient has been escoted off unit and provided with HRT ticket for utilization of ransportation to home.      Emergency Numbers    : [facility] Services: [phone]  Suicide Prevention Line: 1 [phone]) [phone] (TALK)

## 2017-03-27 ENCOUNTER — PATIENT OUTREACH (OUTPATIENT)
Dept: INTERNAL MEDICINE CLINIC | Age: 42
End: 2017-03-27

## 2017-03-27 NOTE — PROGRESS NOTES
NNTOCIP  Nurse Navigator attempted to contact patient post hospitalization from [facility]; 3/21/2017 to 3/24/2017. Left message for the patient to contact the office on the answering machine.      Appointments:  4/12/2017 at 1410 with Dr. Kaley Hernandez

## 2017-03-28 ENCOUNTER — PATIENT OUTREACH (OUTPATIENT)
Dept: INTERNAL MEDICINE CLINIC | Age: 42
End: 2017-03-28

## 2017-03-28 NOTE — PROGRESS NOTES
NNTOCIP  Patient admitted to [facility] on 3/20/17. Contacted patient for post hospital follow up. No answer. Left message introducing self, role and reason for call. Requested return call. Contact information provided. Letter sent.

## 2017-04-05 ENCOUNTER — PATIENT OUTREACH (OUTPATIENT)
Dept: INTERNAL MEDICINE CLINIC | Age: 42
End: 2017-04-05

## 2017-04-05 RX ORDER — ALPRAZOLAM 0.5 MG/1
0.5 TABLET ORAL
COMMUNITY
End: 2021-07-12

## 2017-04-05 NOTE — PROGRESS NOTES
Patient returned call for post hospital follow up. Patient reports he is scheduled to see Dr. Rudy Dasilva at [facility] on 4/12/17. Denies auditory and visual hallucinations, suicidal or homicidal ideations. Currently on light duty. Has appointment on April 10th with occupational health for review of records to return to light duty. Reconciled medications and reviewed allergies. Offered follow up appointment with PCP. Patient will call closer to December to schedule annual physical. Encouraged to contact office with any questions or concerns.

## 2017-04-27 ENCOUNTER — PATIENT OUTREACH (OUTPATIENT)
Dept: INTERNAL MEDICINE CLINIC | Age: 42
End: 2017-04-27

## 2017-04-27 NOTE — PROGRESS NOTES
Episode closed:   Patient admitted to [facility] on 3/20/17. No hospitalization or ED admission post 30 days from discharge of 3/20/17.

## 2017-12-27 ENCOUNTER — OFFICE VISIT (OUTPATIENT)
Dept: INTERNAL MEDICINE CLINIC | Age: 42
End: 2017-12-27

## 2017-12-27 VITALS
HEART RATE: 76 BPM | HEIGHT: 71 IN | TEMPERATURE: 97.9 F | DIASTOLIC BLOOD PRESSURE: 72 MMHG | SYSTOLIC BLOOD PRESSURE: 108 MMHG | RESPIRATION RATE: 12 BRPM | OXYGEN SATURATION: 98 % | BODY MASS INDEX: 25.9 KG/M2 | WEIGHT: 185 LBS

## 2017-12-27 DIAGNOSIS — J45.990 EXERTIONAL ASTHMA: ICD-10-CM

## 2017-12-27 DIAGNOSIS — N52.01 ERECTILE DYSFUNCTION DUE TO ARTERIAL INSUFFICIENCY: Primary | ICD-10-CM

## 2017-12-27 DIAGNOSIS — F99 PSYCHIATRIC DISORDER: ICD-10-CM

## 2017-12-27 RX ORDER — ARIPIPRAZOLE 5 MG/1
TABLET ORAL DAILY
COMMUNITY
End: 2021-05-20

## 2017-12-27 RX ORDER — SILDENAFIL CITRATE 20 MG/1
TABLET ORAL
Qty: 30 TAB | Refills: 5 | Status: SHIPPED | OUTPATIENT
Start: 2017-12-27 | End: 2019-02-14 | Stop reason: SDUPTHER

## 2017-12-27 RX ORDER — TRAZODONE HYDROCHLORIDE 100 MG/1
100 TABLET ORAL
COMMUNITY

## 2017-12-27 RX ORDER — ALBUTEROL SULFATE 90 UG/1
2 AEROSOL, METERED RESPIRATORY (INHALATION)
Qty: 1 INHALER | Refills: 5 | Status: SHIPPED | OUTPATIENT
Start: 2017-12-27

## 2017-12-27 NOTE — MR AVS SNAPSHOT
Visit Information Date & Time Provider Department Dept. Phone Encounter #  
 12/27/2017  3:30 PM Shahid Aviles MD [facility] [phone] [id number] Your Appointments 4/17/2018  9:45 AM  
LAB with [facility] NURSE VISIT [facility] ([address]) Appt Note: labs for rpe rm  
 [address] [facility], [address] [facility] [address] [place]  
  
   
 [address], [address]  
  
    
 4/27/2018  3:00 PM  
PHYSICAL with Shahid Aviles MD  
[facility] [address]) Appt Note: rpe rm  
 [address] [facility], [address] [facility] [address] [address] [address] [place]  
  
   
 [address], [address] Upcoming Health Maintenance Date Due DTaP/Tdap/Td series (2 - Td) 9/17/2023 Allergies as of 12/27/2017  Review Complete On: 12/27/2017 By: Shahid Aviles MD  
 No Known Allergies Current Immunizations  Reviewed on 3/23/2017 Name Date Influenza Vaccine (Quad) PF 9/23/2015 Influenza Vaccine Whole 9/25/2009 TD Vaccine 5/6/2008 Tdap 9/17/2013 Not reviewed this visit You Were Diagnosed With   
  
 Codes Comments Erectile dysfunction due to arterial insufficiency    -  Primary ICD-10-CM: N52.01 
ICD-9-CM: 607.84 Vitals BP Pulse Temp Resp Height(growth percentile) Weight(growth percentile) 108/72 76 97.9 °F (36.6 °C) (Oral) 12 5' 11\" (1.803 m) 185 lb (83.9 kg) SpO2 BMI Smoking Status 98% 25.8 kg/m2 Former Smoker Vitals History BMI and BSA Data Body Mass Index Body Surface Area  
 25.8 kg/m 2 2.05 m 2 Preferred Pharmacy Pharmacy Name Phone [facility] [address] [facility] [phone] Your Updated Medication List  
  
   
This list is accurate as of: 12/27/17  5:09 PM.  Always use your most recent med list.  
  
  
  
  
 ABILIFY 5 mg tablet Generic drug:  ARIPiprazole Take  by mouth daily. albuterol 90 mcg/actuation inhaler Commonly known as:  PROVENTIL HFA, VENTOLIN HFA, PROAIR HFA Take 2 Puffs by inhalation every four (4) hours as needed for Wheezing. CAMPRAL PO Take  by mouth.  
  
 sildenafil (antihypertensive) 20 mg tablet Commonly known as:  REVATIO  
5 tablets by mouth at least 1 hour before intercourse  
  
 traZODone 100 mg tablet Commonly known as:  [employer] Take 100 mg by mouth nightly. VIIBRYD 40 mg Tab tablet Generic drug:  vilazodone Take  by mouth daily. XANAX 0.5 mg tablet Generic drug:  ALPRAZolam  
Take 0.5 mg by mouth daily as needed for Anxiety. Prescriptions Printed Refills  
 albuterol (PROVENTIL HFA, VENTOLIN HFA, PROAIR HFA) 90 mcg/actuation inhaler 5 Sig: Take 2 Puffs by inhalation every four (4) hours as needed for Wheezing. Class: Print Route: Inhalation  
 sildenafil, antihypertensive, (REVATIO) 20 mg tablet 5 Si tablets by mouth at least 1 hour before intercourse Class: Print Introducing [facility]! [facility] introduces [employer] patient portal. Now you can access parts of your medical record, email your doctor's office, and request medication refills online. 1. In your internet browser, go to [email or web address] 2. Click on the First Time User? Click Here link in the Sign In box. You will see the New Member Sign Up page. 3. Enter your [employer] Access Code exactly as it appears below. You will not need to use this code after youve completed the sign-up process. If you do not sign up before the expiration date, you must request a new code. · [employer] Access Code: [email or web address] Expires: 3/27/2018  3:37 PM 
 
4. Enter the last four digits of your Social Security Number (xxxx) and Date of Birth (mm/dd/yyyy) as indicated and click Submit. You will be taken to the next sign-up page. 5. Create a D[employer] ID. This will be your [employer] login ID and cannot be changed, so think of one that is secure and easy to remember. 6. Create a [employer] password. You can change your password at any time. 7. Enter your Password Reset Question and Answer. This can be used at a later time if you forget your password. 8. Enter your e-mail address. You will receive e-mail notification when new information is available in [address] 9. Click Sign Up. You can now view and download portions of your medical record. 10. Click the Download Summary menu link to download a portable copy of your medical information. If you have questions, please visit the Frequently Asked Questions section of the [employer] website. Remember, [employer] is NOT to be used for urgent needs. For medical emergencies, dial 911. Now available from your iPhone and Android! Please provide this summary of care documentation to your next provider. Your primary care clinician is listed as Teresa Waddell. Wilber Choi. If you have any questions after today's visit, please call [phone].

## 2017-12-27 NOTE — PROGRESS NOTES
Aman Wong 1975, is a 38 y.o. male, who is seen today for reevaluation after recent hospitalization for depression and alcoholism and anxiety. 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 He was discharged December 22 and he does feel quite well overall. He is sleeping quite well at night and is also on medication to decrease the urge to drink and he has not had any alcohol since about September 1. He says various clinicians including the psychiatrist gave him different labels 1 of which was schizo atypical personality disorder, another cold him borderline personality in another label was used by a third clinician. He has developed erectile dysfunction and did use Viagra 100 mg in the past which worked well for him. His insurance does not cover this and the tablets cost about $50 each. He also notes that when he exerts substantially he has dyspnea and some wheezing and needs a refill on albuterol. Past Medical History:   Diagnosis Date    Anxiety     Asthma      Current Outpatient Prescriptions   Medication Sig Dispense Refill    ACAMPROSATE CALCIUM (CAMPRAL PO) Take  by mouth.  ARIPiprazole (ABILIFY) 5 mg tablet Take  by mouth daily.  traZODone (DESYREL) 100 mg tablet Take 100 mg by mouth nightly.  albuterol (PROVENTIL HFA, VENTOLIN HFA, PROAIR HFA) 90 mcg/actuation inhaler Take 2 Puffs by inhalation every four (4) hours as needed for Wheezing. 1 Inhaler 5    sildenafil, antihypertensive, (REVATIO) 20 mg tablet 5 tablets by mouth at least 1 hour before intercourse 30 Tab 5    ALPRAZolam (XANAX) 0.5 mg tablet Take 0.5 mg by mouth daily as needed for Anxiety.  vilazodone (VIIBRYD) 40 mg tab tablet Take  by mouth daily.        Visit Vitals    /72    Pulse 76    Temp 97.9 °F (36.6 °C) (Oral)    Resp 12    Ht 5' 11\" (1.803 m)    Wt 185 lb (83.9 kg)    SpO2 98%    BMI 25.8 kg/m2     Carotids are 2+ without bruits. No adenopathy or thyromegaly. Lungs are clear to percussion. Good breath sounds with no wheezing or crackles. Heart reveals a regular rhythm with normal S1 and S2 no murmur gallop click or rub. Abdomen is soft and nontender with no hepatosplenomegaly or masses and no bruits. Extremities reveal no clubbing cyanosis or edema. Pulses are 2+. Assessment: #1. Alcoholism now doing very well since about September 1, he will continue current medication and avoid alcohol altogether for the rest of his life. He has a follow-up appointment with a local psychiatrist that was arranged from [place]. #2.  Psychiatric disorder, various labels as above, we will continue current medication and follow-up with the psychiatrist.  #3.  Exertional asthma, will give him a refill on albuterol inhaler. #4.  Erectile dysfunction which may be partly related to all the medicines he takes. I will give him a prescription for sildenafil 20 mg to be used 5 tablets at least 1 hour before intercourse and the cost of Viagra hopefully will drop considerably over the next several months as various generic versions become available, hopefully within 7 months. The first apparently will be available next month but the cost likely will not drop until other generics are available. I discussed all that with him at some length. Follow-up as needed. Momo Redman MD FACP    Please note: This document has been produced using voice recognition software. Unrecognized errors in transcription may be present.

## 2017-12-28 ENCOUNTER — DOCUMENTATION ONLY (OUTPATIENT)
Dept: INTERNAL MEDICINE CLINIC | Age: 42
End: 2017-12-28

## 2017-12-28 NOTE — PROGRESS NOTES
The PA request for Revatio is not approvable at this time. The indicated use of this medication does not meet the service Benefit Plan's criteria for medical necessity due to the following reason: the use of medications for the treatment of sexual dysfunction or erectile dysfunction are excluded from coverage under the plan.

## 2019-02-14 ENCOUNTER — OFFICE VISIT (OUTPATIENT)
Dept: INTERNAL MEDICINE CLINIC | Age: 44
End: 2019-02-14

## 2019-02-14 VITALS
TEMPERATURE: 97.7 F | SYSTOLIC BLOOD PRESSURE: 112 MMHG | DIASTOLIC BLOOD PRESSURE: 82 MMHG | RESPIRATION RATE: 12 BRPM | WEIGHT: 218 LBS | HEART RATE: 68 BPM | BODY MASS INDEX: 30.52 KG/M2 | HEIGHT: 71 IN | OXYGEN SATURATION: 98 %

## 2019-02-14 DIAGNOSIS — L82.1 SEBORRHEIC KERATOSIS: Primary | ICD-10-CM

## 2019-02-14 DIAGNOSIS — N52.01 ERECTILE DYSFUNCTION DUE TO ARTERIAL INSUFFICIENCY: ICD-10-CM

## 2019-02-14 RX ORDER — SILDENAFIL CITRATE 20 MG/1
TABLET ORAL
Qty: 30 TAB | Refills: 11 | Status: SHIPPED | OUTPATIENT
Start: 2019-02-14 | End: 2020-07-15 | Stop reason: ALTCHOICE

## 2019-02-14 NOTE — PROGRESS NOTES
Vamshi Goode 1975, is a 41 y.o. male, who is seen today for evaluation of a growth on his left buttocks that he noticed a few days ago, he thinks it has gotten a little smaller since then. It is asymptomatic. He also needs a refill on sildenafil for erectile dysfunction, he finds that 100 mg works very well and he has not had a refill on that in over a year. Past Medical History:  
Diagnosis Date  Anxiety  Asthma Current Outpatient Medications Medication Sig Dispense Refill  sildenafil, antihypertensive, (REVATIO) 20 mg tablet 5 tablets by mouth at least 1 hour before intercourse 30 Tab 11  ACAMPROSATE CALCIUM (CAMPRAL PO) Take  by mouth.  ARIPiprazole (ABILIFY) 5 mg tablet Take  by mouth daily.  traZODone (DESYREL) 100 mg tablet Take 100 mg by mouth nightly.  albuterol (PROVENTIL HFA, VENTOLIN HFA, PROAIR HFA) 90 mcg/actuation inhaler Take 2 Puffs by inhalation every four (4) hours as needed for Wheezing. 1 Inhaler 5  ALPRAZolam (XANAX) 0.5 mg tablet Take 0.5 mg by mouth daily as needed for Anxiety.  vilazodone (VIIBRYD) 40 mg tab tablet Take  by mouth daily. Visit Vitals /82 (BP 1 Location: Left arm, BP Patient Position: Sitting) Pulse 68 Temp 97.7 °F (36.5 °C) (Oral) Resp 12 Ht 5' 11\" (1.803 m) Wt 218 lb (98.9 kg) SpO2 98% BMI 30.40 kg/m² On his right buttock there is a scaly small growth perhaps 5 mm in diameter, somewhat irregular border. Assessment: #1. Seborrheic keratosis on his left buttock. I recommended that he put a ruler next to it and of his wife photograph the growth next to the ruler and compare the size in 3-6 months. It is unlikely to get appreciably bigger but if there are significant changes he will call me. #2.  Erectile dysfunction doing well, will renew sildenafil to be used 100 mg at bedtime, generic. Follow-up as needed This visit lasted 15 minutes, greater than 50% of the time was spent counseling on the growth on his buttocks and to monitor that as well as use of sildenafil, I suggested he might try using 2 or 3 tablets at bedtime rather than 5 and that still might work well for this young gentleman. Momo Hamilton, [address] Please note: This document has been produced using voice recognition software. Unrecognized errors in transcription may be present.

## 2019-02-18 ENCOUNTER — DOCUMENTATION ONLY (OUTPATIENT)
Dept: INTERNAL MEDICINE CLINIC | Age: 44
End: 2019-02-18

## 2020-07-14 ENCOUNTER — TELEPHONE (OUTPATIENT)
Dept: INTERNAL MEDICINE CLINIC | Age: 45
End: 2020-07-14

## 2020-07-14 NOTE — TELEPHONE ENCOUNTER
----- Message from Korin Martinez sent at 7/14/2020  2:39 PM EDT -----  Regarding: appt with Dr. Cecile Acosta  Please contact pt regarding lower back spasms.  Contact # [phone]

## 2020-07-15 ENCOUNTER — OFFICE VISIT (OUTPATIENT)
Dept: INTERNAL MEDICINE CLINIC | Age: 45
End: 2020-07-15

## 2020-07-15 VITALS
TEMPERATURE: 97.8 F | HEART RATE: 72 BPM | BODY MASS INDEX: 30.66 KG/M2 | SYSTOLIC BLOOD PRESSURE: 116 MMHG | DIASTOLIC BLOOD PRESSURE: 80 MMHG | WEIGHT: 219 LBS | RESPIRATION RATE: 12 BRPM | HEIGHT: 71 IN

## 2020-07-15 DIAGNOSIS — S39.012A STRAIN OF LUMBAR REGION, INITIAL ENCOUNTER: Primary | ICD-10-CM

## 2020-07-15 RX ORDER — CYCLOBENZAPRINE HCL 10 MG
10 TABLET ORAL
Qty: 30 TAB | Refills: 0 | Status: SHIPPED | OUTPATIENT
Start: 2020-07-15 | End: 2021-05-20

## 2020-07-15 RX ORDER — DICLOFENAC SODIUM 75 MG/1
75 TABLET, DELAYED RELEASE ORAL 2 TIMES DAILY
Qty: 30 TAB | Refills: 0 | Status: SHIPPED | OUTPATIENT
Start: 2020-07-15 | End: 2021-05-20

## 2020-07-15 NOTE — PROGRESS NOTES
Tana Ospina 1975, is a 39 y.o. male, who is seen today for back pain. 4 days ago he was on his knees doing rolls forward on a barrel type apparatus but the next morning he awakened with pain in the left lumbar area. It is now been hurting for 3 days, he is tried ibuprofen up to 800 mg at a time without definite benefit, he used ice initially but it continues to hurt. It hurts with movement but he is fine if he is in bed at night. Past Medical History:   Diagnosis Date    Anxiety     Asthma      Current Outpatient Medications   Medication Sig Dispense Refill    ACAMPROSATE CALCIUM (CAMPRAL PO) Take  by mouth.  ARIPiprazole (ABILIFY) 5 mg tablet Take  by mouth daily.  traZODone (DESYREL) 100 mg tablet Take 100 mg by mouth nightly.  albuterol (PROVENTIL HFA, VENTOLIN HFA, PROAIR HFA) 90 mcg/actuation inhaler Take 2 Puffs by inhalation every four (4) hours as needed for Wheezing. 1 Inhaler 5    ALPRAZolam (XANAX) 0.5 mg tablet Take 0.5 mg by mouth daily as needed for Anxiety.  vilazodone (VIIBRYD) 40 mg tab tablet Take  by mouth daily. Visit Vitals  /80   Pulse 72   Temp 97.8 °F (36.6 °C) (Temporal)   Resp 12   Ht 5' 11\" (1.803 m)   Wt 219 lb (99.3 kg)   BMI 30.54 kg/m²     There is no tenderness in the lumbar area and no tenderness along the lumbar spine. No rash. Normal straight leg raising bilaterally. With forward flexion at about 20 degrees he starts having significant pain and stiffness and prefers not to bend beyond that level. Side to side also starts to hurt after just about 15 degrees of movement of the torso. Assessment: Lumbar strain versus overuse, I recommended that he use intermittent low heat from a heating pad or beanbag from microwave and low heat intermittently, that he start diclofenac 75 mg twice a day for up to 2 weeks and he may use Flexeril as needed as well.   I explained to him that opioids are not indicated for this type of recent pain and x-rays are not helpful. This visit lasted 25 minutes, greater than 50% of the time was spent counseling going over the diagnosis and proposed treatment. Momo Soriano MD FACP    Please note: This document has been produced using voice recognition software. Unrecognized errors in transcription may be present.

## 2020-07-15 NOTE — TELEPHONE ENCOUNTER
----- Message from Thee Daniels sent at 7/15/2020 10:51 AM EDT -----  Regarding: appointment  Patient is requesting an appointment with Dr. Lety Mcneil for lower back pain

## 2021-05-11 ENCOUNTER — TELEPHONE (OUTPATIENT)
Dept: INTERNAL MEDICINE CLINIC | Age: 46
End: 2021-05-11

## 2021-05-20 ENCOUNTER — OFFICE VISIT (OUTPATIENT)
Dept: INTERNAL MEDICINE CLINIC | Age: 46
End: 2021-05-20
Payer: COMMERCIAL

## 2021-05-20 ENCOUNTER — HOSPITAL ENCOUNTER (OUTPATIENT)
Dept: LAB | Age: 46
Discharge: HOME | End: 2021-05-20
Payer: COMMERCIAL

## 2021-05-20 VITALS
TEMPERATURE: 97.2 F | OXYGEN SATURATION: 97 % | HEART RATE: 86 BPM | SYSTOLIC BLOOD PRESSURE: 109 MMHG | DIASTOLIC BLOOD PRESSURE: 64 MMHG | HEIGHT: 71 IN | RESPIRATION RATE: 14 BRPM | WEIGHT: 181 LBS | BODY MASS INDEX: 25.34 KG/M2

## 2021-05-20 DIAGNOSIS — J45.990 EXERTIONAL ASTHMA: ICD-10-CM

## 2021-05-20 DIAGNOSIS — Z87.891 FORMER SMOKER: ICD-10-CM

## 2021-05-20 DIAGNOSIS — F41.8 ANXIETY WITH DEPRESSION: ICD-10-CM

## 2021-05-20 DIAGNOSIS — N52.01 ERECTILE DYSFUNCTION DUE TO ARTERIAL INSUFFICIENCY: ICD-10-CM

## 2021-05-20 DIAGNOSIS — Z00.00 ROUTINE GENERAL MEDICAL EXAMINATION AT A HEALTH CARE FACILITY: ICD-10-CM

## 2021-05-20 DIAGNOSIS — Z00.00 ROUTINE GENERAL MEDICAL EXAMINATION AT A HEALTH CARE FACILITY: Primary | ICD-10-CM

## 2021-05-20 DIAGNOSIS — Z76.89 ENCOUNTER TO ESTABLISH CARE WITH NEW DOCTOR: ICD-10-CM

## 2021-05-20 DIAGNOSIS — R63.8: ICD-10-CM

## 2021-05-20 PROBLEM — F99 PSYCHIATRIC DISORDER: Status: RESOLVED | Noted: 2017-12-27 | Resolved: 2021-05-20

## 2021-05-20 LAB
25(OH)D3 SERPL-MCNC: 73.1 NG/ML (ref 30–100)
ALBUMIN SERPL-MCNC: 4.1 G/DL (ref 3.4–5)
ALBUMIN/GLOB SERPL: 1.4 {RATIO} (ref 0.8–1.7)
ALP SERPL-CCNC: 64 U/L (ref 45–117)
ALT SERPL-CCNC: 32 U/L (ref 16–61)
ANION GAP SERPL CALC-SCNC: 5 MMOL/L (ref 3–18)
APPEARANCE UR: NORMAL
AST SERPL-CCNC: 15 U/L (ref 10–38)
BASOPHILS # BLD: 0.1 K/UL (ref 0–0.1)
BASOPHILS NFR BLD: 1 % (ref 0–2)
BILIRUB SERPL-MCNC: 0.5 MG/DL (ref 0.2–1)
BILIRUB UR QL: NEGATIVE
BUN SERPL-MCNC: 22 MG/DL (ref 7–18)
BUN/CREAT SERPL: 18 (ref 12–20)
CALCIUM SERPL-MCNC: 8.3 MG/DL (ref 8.5–10.1)
CHLORIDE SERPL-SCNC: 107 MMOL/L (ref 100–111)
CHOLEST SERPL-MCNC: 204 MG/DL
CO2 SERPL-SCNC: 27 MMOL/L (ref 21–32)
COLOR UR: YELLOW
CREAT SERPL-MCNC: 1.2 MG/DL (ref 0.6–1.3)
DIFFERENTIAL METHOD BLD: NORMAL
EOSINOPHIL # BLD: 0.1 K/UL (ref 0–0.4)
EOSINOPHIL NFR BLD: 1 % (ref 0–5)
ERYTHROCYTE [DISTWIDTH] IN BLOOD BY AUTOMATED COUNT: 12.8 % (ref 11.6–14.5)
EST. AVERAGE GLUCOSE BLD GHB EST-MCNC: 111 MG/DL
GLOBULIN SER CALC-MCNC: 2.9 G/DL (ref 2–4)
GLUCOSE SERPL-MCNC: 98 MG/DL (ref 74–99)
GLUCOSE UR STRIP.AUTO-MCNC: NEGATIVE MG/DL
HBA1C MFR BLD: 5.5 % (ref 4.2–5.6)
HCT VFR BLD AUTO: 44 % (ref 36–48)
HDLC SERPL-MCNC: 72 MG/DL (ref 40–60)
HDLC SERPL: 2.8 {RATIO} (ref 0–5)
HGB BLD-MCNC: 14.3 G/DL (ref 13–16)
HGB UR QL STRIP: NEGATIVE
KETONES UR QL STRIP.AUTO: NEGATIVE MG/DL
LDLC SERPL CALC-MCNC: 116 MG/DL (ref 0–100)
LEUKOCYTE ESTERASE UR QL STRIP.AUTO: NEGATIVE
LIPID PROFILE,FLP: ABNORMAL
LYMPHOCYTES # BLD: 2.1 K/UL (ref 0.9–3.6)
LYMPHOCYTES NFR BLD: 28 % (ref 21–52)
MAGNESIUM SERPL-MCNC: 2.5 MG/DL (ref 1.6–2.6)
MCH RBC QN AUTO: 27.8 PG (ref 24–34)
MCHC RBC AUTO-ENTMCNC: 32.5 G/DL (ref 31–37)
MCV RBC AUTO: 85.4 FL (ref 74–97)
MONOCYTES # BLD: 0.6 K/UL (ref 0.05–1.2)
MONOCYTES NFR BLD: 8 % (ref 3–10)
NEUTS SEG # BLD: 4.4 K/UL (ref 1.8–8)
NEUTS SEG NFR BLD: 61 % (ref 40–73)
NITRITE UR QL STRIP.AUTO: NEGATIVE
PH UR STRIP: 7.5 [PH] (ref 5–8)
PLATELET # BLD AUTO: 358 K/UL (ref 135–420)
PMV BLD AUTO: 10.1 FL (ref 9.2–11.8)
POTASSIUM SERPL-SCNC: 4.6 MMOL/L (ref 3.5–5.5)
PROT SERPL-MCNC: 7 G/DL (ref 6.4–8.2)
PROT UR STRIP-MCNC: NEGATIVE MG/DL
PSA SERPL-MCNC: 1.2 NG/ML (ref 0–4)
RBC # BLD AUTO: 5.15 M/UL (ref 4.35–5.65)
SODIUM SERPL-SCNC: 139 MMOL/L (ref 136–145)
SP GR UR REFRACTOMETRY: 1.02 (ref 1–1.03)
TRIGL SERPL-MCNC: 80 MG/DL (ref ?–150)
TSH SERPL DL<=0.05 MIU/L-ACNC: 1.94 UIU/ML (ref 0.36–3.74)
UROBILINOGEN UR QL STRIP.AUTO: 0.2 EU/DL (ref 0.2–1)
VLDLC SERPL CALC-MCNC: 16 MG/DL
WBC # BLD AUTO: 7.3 K/UL (ref 4.6–13.2)

## 2021-05-20 PROCEDURE — 81003 URINALYSIS AUTO W/O SCOPE: CPT

## 2021-05-20 PROCEDURE — 85025 COMPLETE CBC W/AUTO DIFF WBC: CPT

## 2021-05-20 PROCEDURE — 80053 COMPREHEN METABOLIC PANEL: CPT

## 2021-05-20 PROCEDURE — 84443 ASSAY THYROID STIM HORMONE: CPT

## 2021-05-20 PROCEDURE — 99215 OFFICE O/P EST HI 40 MIN: CPT | Performed by: NURSE PRACTITIONER

## 2021-05-20 PROCEDURE — 80061 LIPID PANEL: CPT

## 2021-05-20 PROCEDURE — 83036 HEMOGLOBIN GLYCOSYLATED A1C: CPT

## 2021-05-20 PROCEDURE — 82306 VITAMIN D 25 HYDROXY: CPT

## 2021-05-20 PROCEDURE — 83735 ASSAY OF MAGNESIUM: CPT

## 2021-05-20 PROCEDURE — 84153 ASSAY OF PSA TOTAL: CPT

## 2021-05-20 RX ORDER — SILDENAFIL 50 MG/1
50 TABLET, FILM COATED ORAL AS NEEDED
COMMUNITY
End: 2022-05-24 | Stop reason: ALTCHOICE

## 2021-05-20 NOTE — PROGRESS NOTES
Chief Complaint   Patient presents with   [facility] Establish Care       1. Have you been to the ER, urgent care clinic since your last visit? Hospitalized since your last visit? No.    2. Have you seen or consulted any other health care providers outside of the [address] [place] [address] since your last visit? Include any pap smears or colon screening.  No.

## 2021-05-20 NOTE — PROGRESS NOTES
[facility] of [id number] [place] [address]  [place], [address]  [phone] BEVERLEYS/[phone] fax    5/20/2021    HPI:   Marija Villa 1975 is a pleasant WHITE male who presents today to establish care and for routine physical exam. Is a  at the [employer]. Sees Dr Shirlene Mandujano, psychiatrist, who prescribes xanax, trazodone and viagra. He took Adderall from 2008 to 2009 which led him to psychosis. Appts every 3-6 months. No alcohol since April 2020. Quit smoking 20yrs ago. Due to his  meals when on the clock (high in sodium) pt supplements with K+ 1500-2000mg per meal to offset the sodium he is consuming. He consumes water based on his weight. He works out and tries to stay healthy. Uses albuterol after he exerts himself and becomes SOB. Today is complains of intermittent burning when he urinates. The pain is usually at the end of urination. When he stops taking the K+ supplements, he notices the burning will subside. He is also concerned about his HR being between 80-90. While resting/laying down his HR is around 48. When he is up and moving he HR is 80-90. While working out his HR is around 110. His concern is his HR does not bounce back as quickly as he feels it should. Eventually he would like an echocardiogram to assess his heart function. Past Medical History:   Diagnosis Date    Anxiety     Asthma      Past Surgical History:   Procedure Laterality Date    HX APPENDECTOMY      HX HEENT      tonsillectomy    HX HERNIA REPAIR       Current Outpatient Medications   Medication Sig    sildenafil citrate (Viagra) 50 mg tablet Take 50 mg by mouth as needed for Erectile Dysfunction.  traZODone (DESYREL) 100 mg tablet Take 100 mg by mouth nightly.  albuterol (PROVENTIL HFA, VENTOLIN HFA, PROAIR HFA) 90 mcg/actuation inhaler Take 2 Puffs by inhalation every four (4) hours as needed for Wheezing.     ALPRAZolam Lesia Martinez) 0.5 mg tablet Take 0.5 mg by mouth daily as needed for Anxiety. No current facility-administered medications for this visit. Allergies and Intolerances:   No Known Allergies  Family History:   Family History   Problem Relation Age of Onset    Heart Disease Maternal Grandmother      Social History:   He  reports that he quit smoking about 7 years ago. He has never used smokeless tobacco.   Social History     Substance and Sexual Activity   Alcohol Use Yes    Alcohol/week: 70.0 standard drinks    Types: 70 Glasses of wine per week    Comment: Pt verbalized he is a functoning alcoholic      Immunization History:  Immunization History   Administered Date(s) Administered    Influenza Vaccine Clerk) PF (>6 Mo Flulaval, Fluarix, and >3 Yrs Afluria, Fluzone 54452) 09/23/2015    Influenza Vaccine Whole 09/25/2009    TD Vaccine 05/06/2008    Tdap 09/17/2013       Review of Systems:   As above included in HPI. Otherwise 11 point review of systems negative including constitutional, skin, HENT, eyes, respiratory, cardiovascular, gastrointestinal, genitourinary, musculoskeletal, endocrine, hematologic, allergy, and neurologic. Physical:   Visit Vitals  /64   Pulse 86   Temp 97.2 °F (36.2 °C) (Temporal)   Resp 14   Ht 5' 11\" (1.803 m)   Wt 181 lb (82.1 kg)   SpO2 97%   BMI 25.24 kg/m²      Wt Readings from Last 3 Encounters:   05/20/21 181 lb (82.1 kg)   07/15/20 219 lb (99.3 kg)   02/14/19 218 lb (98.9 kg)         Exam:   Physical Exam  Vitals and nursing note reviewed. Constitutional:       Appearance: Normal appearance. HENT:      Head: Normocephalic and atraumatic. Right Ear: Tympanic membrane, ear canal and external ear normal.      Left Ear: Tympanic membrane, ear canal and external ear normal.      Nose: Nose normal.      Mouth/Throat:      Mouth: Mucous membranes are moist.   Eyes:      Extraocular Movements: Extraocular movements intact.       Pupils: Pupils are equal, round, and reactive to light. Neck:      Vascular: No carotid bruit. Cardiovascular:      Rate and Rhythm: Normal rate and regular rhythm. Pulses: Normal pulses. Heart sounds: Normal heart sounds. No murmur heard. No gallop. Comments: No edema noted. Pulmonary:      Effort: Pulmonary effort is normal. No respiratory distress. Breath sounds: Normal breath sounds. No wheezing. Abdominal:      General: Abdomen is flat. Bowel sounds are normal. There is no distension. Palpations: Abdomen is soft. Tenderness: There is no abdominal tenderness. Musculoskeletal:         General: Normal range of motion. Cervical back: Normal range of motion and neck supple. Comments: Gait stable   Skin:     General: Skin is warm and dry. Neurological:      General: No focal deficit present. Mental Status: He is alert and oriented to person, place, and time. Psychiatric:         Mood and Affect: Mood normal.         Behavior: Behavior normal.         Thought Content: Thought content normal.         Judgment: Judgment normal.         Review of Data:  Labs reviewed: N/A  Will obtain today      Plan:    ICD-10-CM ICD-9-CM    1. Routine general medical examination at a health care facility  Z00.00 V70.0 CBC WITH AUTOMATED DIFF      LIPID PANEL      HEMOGLOBIN A1C WITH EAG      MAGNESIUM      METABOLIC PANEL, COMPREHENSIVE      PSA, DIAGNOSTIC (PROSTATE SPECIFIC AG)      TSH 3RD GENERATION      VITAMIN D, 25 HYDROXY      URINALYSIS W/ RFLX MICROSCOPIC   2. Encounter to establish care with new doctor  Z76.89 V65.8    3. Excessive intake of potassium  R63.8 783.9    4. Anxiety with depression  F41.8 300.4    5. Erectile dysfunction due to arterial insufficiency  N52.01 607.84    6. Exertional asthma  J45.990 493.81    7. Former smoker  V17.104 V15.82      -Encounter to establish care new provider  Patient is transferring to me from their previous provider.  I spent no less than 25 minutes reviewing and updating the chart to include previous labs, diagnostics, and specialty notes. -Physical completed. Labs to be drawn today. Results to be called to pt when available. No abnormalities noted. Did explain to patient insurances dont cover imagines such as echocardiograms without a reason and due to pt being asymptomatic there is not a reason to order an echo. -Excessive intake of potassium supplements  Not sure if this is why pt is experiencing burning sensation after voiding. Will obtain UA. He denied the need for STD testing.    -Anxiety w/ Depression  Specialist managed condition is being evaluated/managed by Dr. Crys Franco. No acute findings today meriting change in management plan. -ED  He receives viagra prescription from Dr Crys Franco.     -Exertional asthma  Most likely related to former smoking.    Cont albuterol inhaler as needded      Follow up 1 year  Labs needed 1 week prior to appt: Yes  CMP, Thy levels, CBC, Lipid, UA      Dr. Jeannette Hurtado, AGNP-C, DNP  [facility] of [address] [place] [address]

## 2021-05-21 ENCOUNTER — TELEPHONE (OUTPATIENT)
Dept: INTERNAL MEDICINE CLINIC | Age: 46
End: 2021-05-21

## 2021-07-10 PROBLEM — R41.82 ALTERED MENTAL STATUS: Status: ACTIVE | Noted: 2021-07-10

## 2022-03-18 PROBLEM — N52.01 ERECTILE DYSFUNCTION DUE TO ARTERIAL INSUFFICIENCY: Status: ACTIVE | Noted: 2017-12-27

## 2022-03-19 PROBLEM — L82.1 SEBORRHEIC KERATOSIS: Status: ACTIVE | Noted: 2019-02-14

## 2022-03-19 PROBLEM — J45.990 EXERTIONAL ASTHMA: Status: ACTIVE | Noted: 2017-12-27

## 2022-03-19 PROBLEM — F41.8 ANXIETY WITH DEPRESSION: Status: ACTIVE | Noted: 2017-03-21

## 2022-03-19 PROBLEM — R41.82 ALTERED MENTAL STATUS: Status: ACTIVE | Noted: 2021-07-10

## 2022-03-19 PROBLEM — R63.8: Status: ACTIVE | Noted: 2021-05-20

## 2022-03-20 PROBLEM — Z87.891 FORMER SMOKER: Status: ACTIVE | Noted: 2021-05-20

## 2022-05-24 ENCOUNTER — HOSPITAL ENCOUNTER (OUTPATIENT)
Dept: LAB | Age: 47
Discharge: HOME | End: 2022-05-24
Payer: COMMERCIAL

## 2022-05-24 ENCOUNTER — OFFICE VISIT (OUTPATIENT)
Dept: INTERNAL MEDICINE CLINIC | Age: 47
End: 2022-05-24

## 2022-05-24 VITALS
OXYGEN SATURATION: 97 % | BODY MASS INDEX: 29.93 KG/M2 | TEMPERATURE: 97.5 F | WEIGHT: 213.8 LBS | HEART RATE: 71 BPM | DIASTOLIC BLOOD PRESSURE: 72 MMHG | SYSTOLIC BLOOD PRESSURE: 117 MMHG | RESPIRATION RATE: 18 BRPM | HEIGHT: 71 IN

## 2022-05-24 DIAGNOSIS — Z00.00 LABORATORY TESTS ORDERED AS PART OF A COMPLETE PHYSICAL EXAM (CPE): ICD-10-CM

## 2022-05-24 DIAGNOSIS — R91.1 NODULE OF RIGHT LUNG: ICD-10-CM

## 2022-05-24 DIAGNOSIS — R93.1 ABNORMAL ECHOCARDIOGRAM: ICD-10-CM

## 2022-05-24 DIAGNOSIS — R73.03 PREDIABETES: ICD-10-CM

## 2022-05-24 DIAGNOSIS — N52.9 ERECTILE DYSFUNCTION, UNSPECIFIED ERECTILE DYSFUNCTION TYPE: ICD-10-CM

## 2022-05-24 DIAGNOSIS — F41.8 ANXIETY WITH DEPRESSION: ICD-10-CM

## 2022-05-24 DIAGNOSIS — Z12.11 COLON CANCER SCREENING: ICD-10-CM

## 2022-05-24 DIAGNOSIS — Z00.00 ANNUAL PHYSICAL EXAM: Primary | ICD-10-CM

## 2022-05-24 DIAGNOSIS — J45.990 EXERTIONAL ASTHMA: ICD-10-CM

## 2022-05-24 DIAGNOSIS — F10.929 ALCOHOLIC INTOXICATION WITH COMPLICATION (HCC): ICD-10-CM

## 2022-05-24 DIAGNOSIS — E78.2 MIXED HYPERLIPIDEMIA: ICD-10-CM

## 2022-05-24 LAB
ALBUMIN SERPL-MCNC: 4.4 G/DL (ref 3.4–5)
ALBUMIN/GLOB SERPL: 1.5 {RATIO} (ref 0.8–1.7)
ALP SERPL-CCNC: 66 U/L (ref 45–117)
ALT SERPL-CCNC: 39 U/L (ref 16–61)
ANION GAP SERPL CALC-SCNC: 4 MMOL/L (ref 3–18)
AST SERPL-CCNC: 19 U/L (ref 10–38)
BASOPHILS # BLD: 0.1 K/UL (ref 0–0.1)
BASOPHILS NFR BLD: 1 % (ref 0–2)
BILIRUB SERPL-MCNC: 0.5 MG/DL (ref 0.2–1)
BUN SERPL-MCNC: 19 MG/DL (ref 7–18)
BUN/CREAT SERPL: 15 (ref 12–20)
CALCIUM SERPL-MCNC: 9.2 MG/DL (ref 8.5–10.1)
CHLORIDE SERPL-SCNC: 106 MMOL/L (ref 100–111)
CHOLEST SERPL-MCNC: 243 MG/DL
CO2 SERPL-SCNC: 28 MMOL/L (ref 21–32)
CREAT SERPL-MCNC: 1.26 MG/DL (ref 0.6–1.3)
DIFFERENTIAL METHOD BLD: ABNORMAL
EOSINOPHIL # BLD: 0.3 K/UL (ref 0–0.4)
EOSINOPHIL NFR BLD: 4 % (ref 0–5)
ERYTHROCYTE [DISTWIDTH] IN BLOOD BY AUTOMATED COUNT: 12.5 % (ref 11.6–14.5)
EST. AVERAGE GLUCOSE BLD GHB EST-MCNC: 117 MG/DL
GLOBULIN SER CALC-MCNC: 3 G/DL (ref 2–4)
GLUCOSE SERPL-MCNC: 110 MG/DL (ref 74–99)
HBA1C MFR BLD: 5.7 % (ref 4.2–5.6)
HCT VFR BLD AUTO: 46.9 % (ref 36–48)
HDLC SERPL-MCNC: 58 MG/DL (ref 40–60)
HDLC SERPL: 4.2 {RATIO} (ref 0–5)
HGB BLD-MCNC: 15 G/DL (ref 13–16)
IMM GRANULOCYTES # BLD AUTO: 0 K/UL (ref 0–0.04)
IMM GRANULOCYTES NFR BLD AUTO: 0 % (ref 0–0.5)
LDLC SERPL CALC-MCNC: 149.6 MG/DL (ref 0–100)
LIPID PROFILE,FLP: ABNORMAL
LYMPHOCYTES # BLD: 1.8 K/UL (ref 0.9–3.6)
LYMPHOCYTES NFR BLD: 31 % (ref 21–52)
MCH RBC QN AUTO: 27.5 PG (ref 24–34)
MCHC RBC AUTO-ENTMCNC: 32 G/DL (ref 31–37)
MCV RBC AUTO: 85.9 FL (ref 78–100)
MONOCYTES # BLD: 0.6 K/UL (ref 0.05–1.2)
MONOCYTES NFR BLD: 11 % (ref 3–10)
NEUTS SEG # BLD: 3.2 K/UL (ref 1.8–8)
NEUTS SEG NFR BLD: 53 % (ref 40–73)
NRBC # BLD: 0 K/UL (ref 0–0.01)
NRBC BLD-RTO: 0 PER 100 WBC
PLATELET # BLD AUTO: 306 K/UL (ref 135–420)
PMV BLD AUTO: 9.9 FL (ref 9.2–11.8)
POTASSIUM SERPL-SCNC: 4.6 MMOL/L (ref 3.5–5.5)
PROT SERPL-MCNC: 7.4 G/DL (ref 6.4–8.2)
RBC # BLD AUTO: 5.46 M/UL (ref 4.35–5.65)
SODIUM SERPL-SCNC: 138 MMOL/L (ref 136–145)
TRIGL SERPL-MCNC: 177 MG/DL (ref ?–150)
TSH SERPL DL<=0.05 MIU/L-ACNC: 1.97 UIU/ML (ref 0.36–3.74)
VLDLC SERPL CALC-MCNC: 35.4 MG/DL
WBC # BLD AUTO: 6 K/UL (ref 4.6–13.2)

## 2022-05-24 PROCEDURE — 83036 HEMOGLOBIN GLYCOSYLATED A1C: CPT

## 2022-05-24 PROCEDURE — 80061 LIPID PANEL: CPT

## 2022-05-24 PROCEDURE — 85025 COMPLETE CBC W/AUTO DIFF WBC: CPT

## 2022-05-24 PROCEDURE — 99396 PREV VISIT EST AGE 40-64: CPT | Performed by: NURSE PRACTITIONER

## 2022-05-24 PROCEDURE — 84443 ASSAY THYROID STIM HORMONE: CPT

## 2022-05-24 PROCEDURE — 80053 COMPREHEN METABOLIC PANEL: CPT

## 2022-05-24 RX ORDER — TADALAFIL 20 MG/1
TABLET ORAL
Qty: 30 TABLET | Refills: 3 | Status: SHIPPED | OUTPATIENT
Start: 2022-05-24

## 2022-05-24 RX ORDER — HYDROXYZINE PAMOATE 50 MG/1
CAPSULE ORAL
COMMUNITY
Start: 2022-05-18

## 2022-05-24 RX ORDER — BUSPIRONE HYDROCHLORIDE 15 MG/1
TABLET ORAL
COMMUNITY
Start: 2022-03-29

## 2022-05-24 RX ORDER — TADALAFIL 20 MG/1
20 TABLET ORAL
Qty: 30 TABLET | Refills: 3 | Status: SHIPPED | OUTPATIENT
Start: 2022-05-24 | End: 2022-05-24 | Stop reason: CLARIF

## 2022-05-24 RX ORDER — ARIPIPRAZOLE 2 MG/1
TABLET ORAL
COMMUNITY
Start: 2022-03-29

## 2022-05-24 NOTE — PROGRESS NOTES
Chief Complaint   Patient presents with    Physical     1. \"Have you been to the ER, urgent care clinic since your last visit? Hospitalized since your last visit? \" yes, 7/11/2021    2. \"Have you seen or consulted any other health care providers outside of the [address] [place] [address] since your last visit? \" Yes, a NP Jimi Rankin, Cardiology,   3. For patients aged 39-70: Has the patient had a colonoscopy / FIT/ Cologuard? No      If the patient is female:    4. For patients aged 41-77: Has the patient had a mammogram within the past 2 years? NA - based on age or sex      11. For patients aged 21-65: Has the patient had a pap smear?  NA - based on age or sex

## 2022-05-24 NOTE — PROGRESS NOTES
[facility] of [place] [address] [address] [facility], [address]   [phone] TREVON/[phone] fax    5/24/2022    HPI:   Holger Colmenares 1975 is a pleasant WHITE/NON- maleIs a  at the [employer]. Past Medical History:   Diagnosis Date    Anxiety     Asthma      Past Surgical History:   Procedure Laterality Date    HX APPENDECTOMY      HX HEENT      tonsillectomy    HX HERNIA REPAIR       Current Outpatient Medications   Medication Sig    ARIPiprazole (ABILIFY) 2 mg tablet     busPIRone (BUSPAR) 15 mg tablet     hydrOXYzine pamoate (VISTARIL) 50 mg capsule     tadalafiL (Cialis) 20 mg tablet Take 1 tab by mouth every 3 days as needed. Indications: the inability to have an erection    traZODone (DESYREL) 100 mg tablet Take 100 mg by mouth nightly.  albuterol (PROVENTIL HFA, VENTOLIN HFA, PROAIR HFA) 90 mcg/actuation inhaler Take 2 Puffs by inhalation every four (4) hours as needed for Wheezing. No current facility-administered medications for this visit. Allergies and Intolerances:   No Known Allergies  Family History:   Family History   Problem Relation Age of Onset    Heart Disease Maternal Grandmother      Social History:   He  reports that he quit smoking about 8 years ago. He has never used smokeless tobacco.   Social History     Substance and Sexual Activity   Alcohol Use Yes    Alcohol/week: 70.0 standard drinks    Types: 70 Glasses of wine per week    Comment: Pt verbalized he is a functoning alcoholic      Immunization History:  Immunization History   Administered Date(s) Administered    Influenza Vaccine OfferSavvy) PF (>6 Mo Flulaval, Fluarix, and >3 Yrs Afluria, Fluzone 64066) 09/23/2015    Influenza Vaccine Whole 09/25/2009    TD Vaccine 05/06/2008    Tdap 09/17/2013     Todays concern:  1. Annual physical.   2. Anxiety/Depression. Attempted suicide 7/2021.  Under the care of Dr Jasen Quintana who prescribes xanax, trazodone and viagra. He took Adderall from 2008 to 2009 which led him to psychosis. Appts every 3-6 months. 3. SOB (rare occasions). Uses albuterol prn.   4. Cardio. Saw Minnie Boland Cardio NP with [facility]. Raya Cindy echo that revealed mild leaky mitral valve and dilated aortic root. Also completed CTA that revealed rt lung nodule. Denies SOB, CP, edema, syncope. Follow up in 1 year (approx March)    Review of Systems:   As above included in HPI. Physical:   Visit Vitals  /72   Pulse 71   Temp 97.5 °F (36.4 °C) (Temporal)   Resp 18   Ht 5' 11\" (1.803 m)   Wt 213 lb 12.8 oz (97 kg)   SpO2 97%   BMI 29.82 kg/m²      Wt Readings from Last 3 Encounters:   05/24/22 213 lb 12.8 oz (97 kg)   07/12/21 188 lb 11.4 oz (85.6 kg)   05/20/21 181 lb (82.1 kg)         Exam:   Physical Exam  Vitals and nursing note reviewed. Constitutional:       Appearance: Normal appearance. He is obese. HENT:      Head: Normocephalic and atraumatic. Right Ear: Tympanic membrane, ear canal and external ear normal.      Left Ear: Tympanic membrane, ear canal and external ear normal.   Eyes:      Extraocular Movements: Extraocular movements intact. Conjunctiva/sclera: Conjunctivae normal.   Neck:      Vascular: No carotid bruit. Cardiovascular:      Rate and Rhythm: Normal rate and regular rhythm. Pulses: Normal pulses. Heart sounds: Normal heart sounds. No murmur heard. No gallop. Comments: No edema noted. Pulmonary:      Effort: Pulmonary effort is normal. No respiratory distress. Breath sounds: Normal breath sounds. No wheezing. Abdominal:      General: Abdomen is flat. Bowel sounds are normal. There is no distension. Palpations: Abdomen is soft. Tenderness: There is no abdominal tenderness. Genitourinary:     Comments: Deferred   Musculoskeletal:         General: Normal range of motion. Cervical back: Normal range of motion and neck supple.       Comments: Gait stable  Muscle strength 5/5 to all extremities   Skin:     General: Skin is warm and dry. Neurological:      General: No focal deficit present. Mental Status: He is alert and oriented to person, place, and time. Psychiatric:         Mood and Affect: Mood normal.         Behavior: Behavior normal.         Thought Content: Thought content normal.         Judgment: Judgment normal.         Review of Data: Will obtain today      Plan:    ICD-10-CM ICD-9-CM    1. Annual physical exam  Z00.00 V70.0    2. Exertional asthma  J45.990 493.81    3. Erectile dysfunction, unspecified erectile dysfunction type  N52.9 607.84 tadalafiL (Cialis) 20 mg tablet      DISCONTINUED: tadalafiL (Cialis) 20 mg tablet   4. Anxiety with depression  F41.8 300.4    5. Al[facility]olic intoxication with complication ([facility]  Y52.781 305.00    6. Nodule of right lung  R91.1 793.11    7. Abnormal echocardiogram  R93.1 793.2    8. Colon cancer screening  Z12.11 V76.51 REFERRAL TO GASTROENTEROLOGY   9. Laboratory tests ordered as part of a complete physical exam (CPE)  Z00.00 V72.62 CBC WITH AUTOMATED DIFF      METABOLIC PANEL, COMPREHENSIVE      HEMOGLOBIN A1C WITH EAG      LIPID PANEL      TSH 3RD GENERATION     -Annual Physical completed  Encouraged bi-annual eye exams    -Exertional asthma  Well controlled  Former smoker  Cont albuterol inhaler as needed      -Erectile dysfunction  DC Viagra  Initiate Cialis  Goodrx coupon supplied    -Anxiety w/ Depression  Cntinue Abilify, Buspar, trazodone, and xanax as prescribed by psych  Specialist managed condition is being evaluated/managed by Dr. Magdalena Coats. No acute findings today meriting change in management plan. -Alcoholic intoxication w/ complication  3/7336 Seen in ED for \"attempt suicide\" Held x2 days under TDO in psych facility. Marital issues sparked behavior (According to ED medical note in July 2021).     -Colon cancer screening  Referral to GI    -Right lung nodule  3/2022 Found on CTA  Under the care of Farheen Manriquez, Cardio NP.     -Abnormal echo  Requested record from cardio      Follow up 1 year with labs (CMP, CBC, Lipid)      Dr. Phuong Sheth, JOSE GUADALUPEP-C, DNP  [facility] of [address] [place] [address]

## 2022-06-01 PROBLEM — E78.2 MIXED HYPERLIPIDEMIA: Status: ACTIVE | Noted: 2022-06-01

## 2022-06-01 PROBLEM — R73.03 PREDIABETES: Status: ACTIVE | Noted: 2022-06-01

## 2022-06-02 ENCOUNTER — TELEPHONE (OUTPATIENT)
Dept: INTERNAL MEDICINE CLINIC | Age: 47
End: 2022-06-02

## 2022-06-02 NOTE — TELEPHONE ENCOUNTER
----- Message from Anna Marie De La Vega DNP sent at 2022  8:02 PM EDT -----  Pls call pt with the followin. A1c 5.7-prediabetes. Reduce carbs and sweets in diet; weight loss needed. 2. CBC, Thy and CMP ok  3.  TG ; -149  Thank you

## 2022-06-02 NOTE — TELEPHONE ENCOUNTER
Patient reached and made aware of lab results per Dr. Karishma Elliott. Patient inquiring about results from a echocardiogram completed back in March of this year. Patient states he had this completed and the results were supposed to be sent to Dr. Karishma Elliott. Informed pt I don't see anything in chart. Please advise if you know what he is referring to. He stated you would know.

## 2022-06-02 NOTE — PROGRESS NOTES
Pls call pt with the followin. A1c 5.7-prediabetes. Reduce carbs and sweets in diet; weight loss needed. 2. CBC, Thy and CMP ok  3.  TG ; -149  Thank you

## 2022-06-02 NOTE — TELEPHONE ENCOUNTER
Patient advised we have not received echo results and to reach out to cardio to have it sent to our office.

## 2022-08-10 ENCOUNTER — VIRTUAL VISIT (OUTPATIENT)
Dept: INTERNAL MEDICINE CLINIC | Age: 47
End: 2022-08-10
Payer: COMMERCIAL

## 2022-08-10 DIAGNOSIS — F41.8 ANXIETY WITH DEPRESSION: Primary | ICD-10-CM

## 2022-08-10 PROCEDURE — 99212 OFFICE O/P EST SF 10 MIN: CPT | Performed by: NURSE PRACTITIONER

## 2022-08-10 NOTE — PROGRESS NOTES
Chief Complaint   Patient presents with    Discuss Medications     1. \"Have you been to the ER, urgent care clinic since your last visit? Hospitalized since your last visit? \" No    2. \"Have you seen or consulted any other health care providers outside of the [address] [place] [address] since your last visit? \" No     3. For patients aged 39-70: Has the patient had a colonoscopy / FIT/ Cologuard? No      If the patient is female:    4. For patients aged 41-77: Has the patient had a mammogram within the past 2 years? NA - based on age or sex      11. For patients aged 21-65: Has the patient had a pap smear?  NA - based on age or sex

## 2022-08-10 NOTE — PROGRESS NOTES
[facility] [place] [address] [address] [facility], [address]   [phone] [facility][phone] fax    8/10/2022    HPI:   Shailesh Gutierrez 1975 is a pleasant WHITE/NON- male who presents virtual today for a sick visit. Past Medical History:   Diagnosis Date    Anxiety     Asthma      Past Surgical History:   Procedure Laterality Date    HX APPENDECTOMY      HX HEENT      tonsillectomy    HX HERNIA REPAIR       Current Outpatient Medications   Medication Sig    ARIPiprazole (ABILIFY) 2 mg tablet     busPIRone (BUSPAR) 15 mg tablet     hydrOXYzine pamoate (VISTARIL) 50 mg capsule     tadalafiL (Cialis) 20 mg tablet Take 1 tab by mouth every 3 days as needed. Indications: the inability to have an erection    traZODone (DESYREL) 100 mg tablet Take 100 mg by mouth nightly. albuterol (PROVENTIL HFA, VENTOLIN HFA, PROAIR HFA) 90 mcg/actuation inhaler Take 2 Puffs by inhalation every four (4) hours as needed for Wheezing. No current facility-administered medications for this visit. Allergies and Intolerances:   No Known Allergies  Family History:   Family History   Problem Relation Age of Onset    Heart Disease Maternal Grandmother      Social History:   He  reports that he quit smoking about 9 years ago. He has never used smokeless tobacco.   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   He is seeking a prescription for Xanax as he is taking this medication in the past.  He is taking Abilify, trazodone, Vistaril, BuSpar. He does see a psychiatrist.  His next appointment was in October. He states his psychiatrist will not prescribe Xanax. He denies SI/HI. Review of Systems:   As above included in HPI. Otherwise 11 point review of systems negative including constitutional, skin, HENT, eyes, respiratory, cardiovascular, gastrointestinal, genitourinary, musculoskeletal, endocrine, hematologic, allergy, and neurologic. Physical:   Exam:   Vital Signs: (As obtained by patient/caregiver at home)  There were not vitals taken for this visit. PHYSICAL EXAMINATION:  [ INSTRUCTIONS:  \"[x]\" Indicates a positive item  \"[]\" Indicates a negative item  -- DELETE ALL ITEMS NOT EXAMINED]      Constitutional: [x] Appears well-developed and well-nourished [x] No apparent distress      [] Abnormal -     Mental status: [x] Alert and awake  [x] Oriented to person/place/time [x] Able to follow commands    [] Abnormal -     Eyes:   EOM    [x]  Normal    [] Abnormal -   Sclera  [x]  Normal    [] Abnormal -          Discharge [x]  None visible   [] Abnormal -     HENT, Neck: [x] Normocephalic, atraumatic  [] Abnormal - .   [x] Mouth/Throat: Mucous membranes are moist    Pulmonary/Chest: [x] Respiratory effort normal   [x] No visualized signs of difficulty breathing or respiratory distress        [] Abnormal -      Musculoskeletal:   [] Normal gait with no signs of ataxia         [] Normal range of motion of neck        [] Abnormal -did not assess    Neurological:        [x] No Facial Asymmetry (Cranial nerve 7 motor function) (limited exam due to video visit)          [] No gaze palsy        [] Abnormal -did not assess        Skin:        [] No significant exanthematous lesions or discoloration noted on facial skin         [] Abnormal -did not assess           Psychiatric:       [x] Normal Affect [] Abnormal - [x] No Hallucinations,  no physical signs of anxiety or stress      Plan:  1. Anxiety with depression  Educated patient on the negative side effects of benzos along with addictive therapies. Benzos can interact with other meds he is taking. Increase vistaril 50mg to 2 tabs in am 1 tab at 2pm and 2 tabs at bedtime to help lessen anxiety. Strongly encouraged pt to reach out to his psych to schedule a sooner appt than Oct.   Pt verb understanding. Location:  Provider-in office  Patient: home      Dr. Ginette Cortés, ERIKA-ERIN, DNP  [facility]     The total time 10 minutes. At least 50% of that time was spent in counseling and/or coordination of care. My summary of patient counseling and coordination of care includes reviewing medical record, assessing patient, placing orders, and discussing plan of care with patient. Prior to seeing this patient, I reviewed records, including previously completed appointments/records, reviewed specialty records in [facility], and updated visits from other providers since I saw the patient last.         Emanuel Rod, who was evaluated through a synchronous (real-time) audio-video encounter, and/or his healthcare decision maker, is aware that it is a billable service, which includes applicable co-pays, with coverage as determined by his insurance carrier. He provided verbal consent to proceed: Yes, and patient identification was verified. This visit was conducted pursuant to the emergency declaration under the [facility], [address] [place] [address] authority and the [employer] Act. A caregiver was present when appropriate. Ability to conduct physical exam was limited. The patient was located in a state where the provider was licensed to provide care.      --Addy Marquez DNP on 8/10/2022 at 12:57 PM

## 2023-01-31 NOTE — LETTER
3/28/2017 12:32 PM 
 
Mr. Jordy Posada [address] [address] I am a Nurse Navigator working with your physician's office. Part of my job is to follow up with patients who have been in the emergency department or hospital to see how they are feeling, answer any questions they may have about their visit and also make sure they have a follow-up appointment to see their primary care doctor. I can also provide resources to patients that have other needs. Please call me if you need assistance with affording medications, referrals to specialists. need transportation to physician appointments or any other issue or concern. I can be reached Monday thru Friday at the telephone number listed above. Thank you for allowing us to participate in your care.   
 
 
 
 
 
 
 
Sincerely, 
 
 
Chhaya Zuniga RN 
 
 Patient

## 2023-05-24 ENCOUNTER — TELEPHONE (OUTPATIENT)
Age: 48
End: 2023-05-24

## 2023-05-24 DIAGNOSIS — R73.03 PREDIABETES: ICD-10-CM

## 2023-05-24 DIAGNOSIS — E78.2 MIXED HYPERLIPIDEMIA: Primary | ICD-10-CM

## 2023-05-24 NOTE — TELEPHONE ENCOUNTER
Lab orders are entered. Thank you     Diagnosis Orders   1. Mixed hyperlipidemia  Lipid Panel      2.  Prediabetes  Hemoglobin A1C

## 2023-05-24 NOTE — TELEPHONE ENCOUNTER
Please put lab orders in Epic. Pt has appt 06/06/23. He will go offsite for lab collection. Please advise and I will mail orders to pt.   Thank you

## 2023-06-06 ENCOUNTER — OFFICE VISIT (OUTPATIENT)
Age: 48
End: 2023-06-06
Payer: COMMERCIAL

## 2023-06-06 VITALS
HEART RATE: 71 BPM | WEIGHT: 184 LBS | TEMPERATURE: 98.6 F | BODY MASS INDEX: 24.92 KG/M2 | OXYGEN SATURATION: 98 % | DIASTOLIC BLOOD PRESSURE: 57 MMHG | SYSTOLIC BLOOD PRESSURE: 110 MMHG | HEIGHT: 72 IN | RESPIRATION RATE: 18 BRPM

## 2023-06-06 DIAGNOSIS — Z00.00 ANNUAL PHYSICAL EXAM: Primary | ICD-10-CM

## 2023-06-06 DIAGNOSIS — R91.1 NODULE OF RIGHT LUNG: ICD-10-CM

## 2023-06-06 DIAGNOSIS — E78.2 MIXED HYPERLIPIDEMIA: ICD-10-CM

## 2023-06-06 DIAGNOSIS — F10.929 ALCOHOL USE, UNSPECIFIED WITH INTOXICATION, UNSPECIFIED (HCC): ICD-10-CM

## 2023-06-06 DIAGNOSIS — F41.8 ANXIETY WITH DEPRESSION: ICD-10-CM

## 2023-06-06 PROBLEM — R63.8: Status: RESOLVED | Noted: 2021-05-20 | Resolved: 2023-06-06

## 2023-06-06 PROBLEM — N52.9 ERECTILE DYSFUNCTION: Status: RESOLVED | Noted: 2022-05-24 | Resolved: 2023-06-06

## 2023-06-06 PROBLEM — F41.9 ANXIETY: Status: ACTIVE | Noted: 2023-06-06

## 2023-06-06 PROBLEM — R41.82 ALTERED MENTAL STATUS: Status: RESOLVED | Noted: 2021-07-10 | Resolved: 2023-06-06

## 2023-06-06 PROBLEM — F41.9 ANXIETY: Status: RESOLVED | Noted: 2023-06-06 | Resolved: 2023-06-06

## 2023-06-06 PROCEDURE — 99396 PREV VISIT EST AGE 40-64: CPT | Performed by: NURSE PRACTITIONER

## 2023-06-06 SDOH — ECONOMIC STABILITY: HOUSING INSECURITY
IN THE LAST 12 MONTHS, WAS THERE A TIME WHEN YOU DID NOT HAVE A STEADY PLACE TO SLEEP OR SLEPT IN A SHELTER (INCLUDING NOW)?: NO

## 2023-06-06 SDOH — ECONOMIC STABILITY: FOOD INSECURITY: WITHIN THE PAST 12 MONTHS, YOU WORRIED THAT YOUR FOOD WOULD RUN OUT BEFORE YOU GOT MONEY TO BUY MORE.: NEVER TRUE

## 2023-06-06 SDOH — ECONOMIC STABILITY: FOOD INSECURITY: WITHIN THE PAST 12 MONTHS, THE FOOD YOU BOUGHT JUST DIDN'T LAST AND YOU DIDN'T HAVE MONEY TO GET MORE.: NEVER TRUE

## 2023-06-06 SDOH — ECONOMIC STABILITY: INCOME INSECURITY: HOW HARD IS IT FOR YOU TO PAY FOR THE VERY BASICS LIKE FOOD, HOUSING, MEDICAL CARE, AND HEATING?: NOT HARD AT ALL

## 2023-06-06 ASSESSMENT — PATIENT HEALTH QUESTIONNAIRE - PHQ9
2. FEELING DOWN, DEPRESSED OR HOPELESS: 0
1. LITTLE INTEREST OR PLEASURE IN DOING THINGS: 0
SUM OF ALL RESPONSES TO PHQ QUESTIONS 1-9: 2
SUM OF ALL RESPONSES TO PHQ QUESTIONS 1-9: 2
5. POOR APPETITE OR OVEREATING: 0
7. TROUBLE CONCENTRATING ON THINGS, SUCH AS READING THE NEWSPAPER OR WATCHING TELEVISION: 0
3. TROUBLE FALLING OR STAYING ASLEEP: 2
9. THOUGHTS THAT YOU WOULD BE BETTER OFF DEAD, OR OF HURTING YOURSELF: 0
6. FEELING BAD ABOUT YOURSELF - OR THAT YOU ARE A FAILURE OR HAVE LET YOURSELF OR YOUR FAMILY DOWN: 0
10. IF YOU CHECKED OFF ANY PROBLEMS, HOW DIFFICULT HAVE THESE PROBLEMS MADE IT FOR YOU TO DO YOUR WORK, TAKE CARE OF THINGS AT HOME, OR GET ALONG WITH OTHER PEOPLE: 0
SUM OF ALL RESPONSES TO PHQ QUESTIONS 1-9: 2
8. MOVING OR SPEAKING SO SLOWLY THAT OTHER PEOPLE COULD HAVE NOTICED. OR THE OPPOSITE, BEING SO FIGETY OR RESTLESS THAT YOU HAVE BEEN MOVING AROUND A LOT MORE THAN USUAL: 0
SUM OF ALL RESPONSES TO PHQ9 QUESTIONS 1 & 2: 0
SUM OF ALL RESPONSES TO PHQ QUESTIONS 1-9: 2
4. FEELING TIRED OR HAVING LITTLE ENERGY: 0

## 2023-06-06 NOTE — ASSESSMENT & PLAN NOTE
Patient stopped all psych meds  Wants to control his anxiety/depression with natural resources such as exercise  Unfortunately stopping all meds, patient has turned to alcohol. Discussed his plan as this is impacting his marriage and possibly work. Patient wishes to stay off of all medications and continue to work on his behaviors. He is well aware of the impact this is having on his family. He feels if he avoids becoming tired, he will not turn to alcohol.   He no longer sees Dr. Berdine Litten, psych  He denies SI/HI  Informed patient to seek help at the nearest ED if he experiences those type of symptoms

## 2023-06-06 NOTE — ASSESSMENT & PLAN NOTE
Health Maintenance:   Colonoscopy: Due 11/2027  PSA: N/A  Immunizations: N/A  LDCT: N/A  Eye Exam: will schedule    Patient to complete labs tomorrow

## 2023-06-06 NOTE — ASSESSMENT & PLAN NOTE
Discussed alcohol not being appropriate treatment for anxiety and depression. Alcohol at this point is a crutch and is causing him grief within his home. He is suffering with anger issues since stopping all of his psych meds. Discussed the negative impact patient is dealing with or will be dealing with if he allows alcohol to become a habit.   Patient is not seeking psych nor counseling

## 2023-06-06 NOTE — PROGRESS NOTES
[facility]      6/6/2023    [facility] 1975 is a pleasant White (non-) male for CPE. Reason for Visit:    Chief Complaint   Patient presents with    Annual Exam    Discuss Labs     Labs not  completed       Patient Active Problem List   Diagnosis    Erectile dysfunction due to arterial insufficiency    Anxiety with depression    Exertional asthma    Seborrheic keratosis    Former smoker    Alcohol use, unspecified with intoxication, unspecified ([address]    Nodule of right lung    Mixed hyperlipidemia    Prediabetes    Annual physical exam       Past Medical History:   Diagnosis Date    Alcohol use, unspecified with intoxication, unspecified ([address] 5/24/2022    Anxiety     Anxiety with depression 3/21/2017    Asthma     Erectile dysfunction due to arterial insufficiency 12/27/2017    Exertional asthma 12/27/2017    Former smoker 5/20/2021    Mixed hyperlipidemia 6/1/2022    Nodule of right lung 5/24/2022    CTA 2022 5mm nodule on Rt Every 2 yr follow up-due 2024    Prediabetes 6/1/2022       Past Surgical History:   Procedure Laterality Date    APPENDECTOMY      HEENT      tonsillectomy    HERNIA REPAIR         Family History   Problem Relation Age of Onset    Heart Disease Maternal Grandmother        Immunization History   Administered Date(s) Administered    Influenza Virus Vaccine 09/25/2009    Influenza, FLUARIX, FLULAVAL, FLUZONE (age 10 mo+) AND AFLURIA, (age 1 y+), PF, 0.5mL 09/23/2015    TDaP, ADACEL (age 10y-63y), BOOSTRIX (age 10y+), IM, 0.5mL 09/17/2013    Td vaccine (adult) 05/06/2008       No Known Allergies      Current Outpatient Medications:     albuterol sulfate HFA (PROVENTIL;VENTOLIN;PROAIR) 108 (90 Base) MCG/ACT inhaler, Inhale 2 puffs into the lungs every 4 hours as needed, Disp: , Rfl:     tadalafil (CIALIS) 20 MG tablet, Take 1 tab by mouth every 3 days as needed.   Indications: the inability to have an erection, Disp: , Rfl:     Todays

## 2023-06-06 NOTE — PROGRESS NOTES
General Brood presents today for   Chief Complaint   Patient presents with    Annual Exam    Discuss Labs     Labs not  completed        BP (!) 110/57 (Site: Left Upper Arm, Position: Sitting, Cuff Size: Large Adult)   Pulse 71   Temp 98.6 °F (37 °C) (Temporal)   Resp 18   Ht 6' (1.829 m)   Wt 184 lb (83.5 kg)   SpO2 98%   BMI 24.95 kg/m²      1. \"Have you been to the ER, urgent care clinic since your last visit? Hospitalized since your last visit? \" No    2. \"Have you seen or consulted any other health care providers outside of the [address] [place] [address] since your last visit? \" No     3. For patients aged 39-70: Has the patient had a colonoscopy / FIT/ Cologuard?  Yes - no Care Gap present

## 2023-06-07 ENCOUNTER — HOSPITAL ENCOUNTER (OUTPATIENT)
Facility: HOSPITAL | Age: 48
Discharge: HOME | End: 2023-06-10
Payer: COMMERCIAL

## 2023-06-07 DIAGNOSIS — E78.2 MIXED HYPERLIPIDEMIA: ICD-10-CM

## 2023-06-07 DIAGNOSIS — R73.03 PREDIABETES: ICD-10-CM

## 2023-06-07 LAB
CHOLEST SERPL-MCNC: 213 MG/DL
EST. AVERAGE GLUCOSE BLD GHB EST-MCNC: 114 MG/DL
HBA1C MFR BLD: 5.6 % (ref 4.2–5.6)
HDLC SERPL-MCNC: 87 MG/DL (ref 40–60)
HDLC SERPL: 2.4 (ref 0–5)
LDLC SERPL CALC-MCNC: 118 MG/DL (ref 0–100)
LIPID PANEL: ABNORMAL
TRIGL SERPL-MCNC: 40 MG/DL
VLDLC SERPL CALC-MCNC: 8 MG/DL

## 2023-06-07 PROCEDURE — 83036 HEMOGLOBIN GLYCOSYLATED A1C: CPT

## 2023-06-07 PROCEDURE — 80061 LIPID PANEL: CPT

## 2023-06-07 PROCEDURE — 36415 COLL VENOUS BLD VENIPUNCTURE: CPT

## 2023-06-09 ENCOUNTER — TELEPHONE (OUTPATIENT)
Age: 48
End: 2023-06-09

## 2023-06-09 NOTE — TELEPHONE ENCOUNTER
----- Message from Jorge Pearce DNP sent at 6/8/2023  2:40 PM EDT -----  [facility] nurses, pls contact patient with the following: (Please do not send message via [employer], contact them via phone)    Triglyceride dropped from 177-40  LDL dropped from 149-118  Continue to work on reducing fried fatty foods, continue at the gym deferred calories and fat    Recheck level 1 year    Thank you

## 2023-07-06 PROBLEM — Z00.00 ANNUAL PHYSICAL EXAM: Status: RESOLVED | Noted: 2023-06-06 | Resolved: 2023-07-06

## 2023-12-06 ENCOUNTER — TELEPHONE (OUTPATIENT)
Age: 48
End: 2023-12-06

## 2023-12-07 ENCOUNTER — OFFICE VISIT (OUTPATIENT)
Age: 48
End: 2023-12-07
Payer: COMMERCIAL

## 2023-12-07 VITALS
DIASTOLIC BLOOD PRESSURE: 66 MMHG | TEMPERATURE: 98.1 F | SYSTOLIC BLOOD PRESSURE: 103 MMHG | HEART RATE: 84 BPM | RESPIRATION RATE: 18 BRPM | WEIGHT: 190 LBS | BODY MASS INDEX: 25.73 KG/M2 | OXYGEN SATURATION: 99 % | HEIGHT: 72 IN

## 2023-12-07 DIAGNOSIS — F10.929 ALCOHOL USE, UNSPECIFIED WITH INTOXICATION, UNSPECIFIED (HCC): ICD-10-CM

## 2023-12-07 DIAGNOSIS — R73.03 PREDIABETES: ICD-10-CM

## 2023-12-07 DIAGNOSIS — F43.10 PTSD (POST-TRAUMATIC STRESS DISORDER): ICD-10-CM

## 2023-12-07 DIAGNOSIS — M62.830 MUSCLE SPASM OF BACK: ICD-10-CM

## 2023-12-07 DIAGNOSIS — F33.3 SEVERE EPISODE OF RECURRENT MAJOR DEPRESSIVE DISORDER, WITH PSYCHOTIC FEATURES (HCC): Primary | ICD-10-CM

## 2023-12-07 DIAGNOSIS — F41.1 GAD (GENERALIZED ANXIETY DISORDER): ICD-10-CM

## 2023-12-07 DIAGNOSIS — E78.2 MIXED HYPERLIPIDEMIA: ICD-10-CM

## 2023-12-07 PROCEDURE — 99214 OFFICE O/P EST MOD 30 MIN: CPT | Performed by: NURSE PRACTITIONER

## 2023-12-07 RX ORDER — TRAZODONE HYDROCHLORIDE 50 MG/1
TABLET ORAL
COMMUNITY
Start: 2023-12-04 | End: 2023-12-07 | Stop reason: SDUPTHER

## 2023-12-07 RX ORDER — QUETIAPINE FUMARATE 100 MG/1
100 TABLET, FILM COATED ORAL 3 TIMES DAILY
Qty: 90 TABLET | Refills: 0 | Status: SHIPPED | OUTPATIENT
Start: 2023-12-07

## 2023-12-07 RX ORDER — BUSPIRONE HYDROCHLORIDE 10 MG/1
10 TABLET ORAL 3 TIMES DAILY
Qty: 90 TABLET | Refills: 0 | Status: SHIPPED | OUTPATIENT
Start: 2023-12-07 | End: 2024-01-06

## 2023-12-07 RX ORDER — TRAZODONE HYDROCHLORIDE 50 MG/1
50 TABLET ORAL NIGHTLY
Qty: 30 TABLET | Refills: 0 | Status: SHIPPED | OUTPATIENT
Start: 2023-12-07

## 2023-12-07 RX ORDER — METHOCARBAMOL 750 MG/1
750 TABLET, FILM COATED ORAL 2 TIMES DAILY PRN
Qty: 20 TABLET | Refills: 0 | Status: SHIPPED | OUTPATIENT
Start: 2023-12-07 | End: 2024-01-06

## 2023-12-07 RX ORDER — QUETIAPINE FUMARATE 100 MG/1
TABLET, FILM COATED ORAL
COMMUNITY
Start: 2023-12-04 | End: 2023-12-07 | Stop reason: SDUPTHER

## 2023-12-07 RX ORDER — BENZTROPINE MESYLATE 0.5 MG/1
TABLET ORAL
COMMUNITY
Start: 2023-12-04

## 2023-12-07 ASSESSMENT — COLUMBIA-SUICIDE SEVERITY RATING SCALE - C-SSRS
4. HAVE YOU HAD THESE THOUGHTS AND HAD SOME INTENTION OF ACTING ON THEM?: NO
5. HAVE YOU STARTED TO WORK OUT OR WORKED OUT THE DETAILS OF HOW TO KILL YOURSELF? DO YOU INTEND TO CARRY OUT THIS PLAN?: NO
3. HAVE YOU BEEN THINKING ABOUT HOW YOU MIGHT KILL YOURSELF?: NO
7. DID THIS OCCUR IN THE LAST THREE MONTHS: NO

## 2023-12-07 ASSESSMENT — PATIENT HEALTH QUESTIONNAIRE - PHQ9
SUM OF ALL RESPONSES TO PHQ QUESTIONS 1-9: 9
8. MOVING OR SPEAKING SO SLOWLY THAT OTHER PEOPLE COULD HAVE NOTICED. OR THE OPPOSITE, BEING SO FIGETY OR RESTLESS THAT YOU HAVE BEEN MOVING AROUND A LOT MORE THAN USUAL: 3
5. POOR APPETITE OR OVEREATING: 0
1. LITTLE INTEREST OR PLEASURE IN DOING THINGS: 3
6. FEELING BAD ABOUT YOURSELF - OR THAT YOU ARE A FAILURE OR HAVE LET YOURSELF OR YOUR FAMILY DOWN: 2
SUM OF ALL RESPONSES TO PHQ QUESTIONS 1-9: 9
SUM OF ALL RESPONSES TO PHQ QUESTIONS 1-9: 9
3. TROUBLE FALLING OR STAYING ASLEEP: 0
10. IF YOU CHECKED OFF ANY PROBLEMS, HOW DIFFICULT HAVE THESE PROBLEMS MADE IT FOR YOU TO DO YOUR WORK, TAKE CARE OF THINGS AT HOME, OR GET ALONG WITH OTHER PEOPLE: 1
7. TROUBLE CONCENTRATING ON THINGS, SUCH AS READING THE NEWSPAPER OR WATCHING TELEVISION: 0
9. THOUGHTS THAT YOU WOULD BE BETTER OFF DEAD, OR OF HURTING YOURSELF: 0
SUM OF ALL RESPONSES TO PHQ QUESTIONS 1-9: 9
4. FEELING TIRED OR HAVING LITTLE ENERGY: 0
SUM OF ALL RESPONSES TO PHQ9 QUESTIONS 1 & 2: 4
2. FEELING DOWN, DEPRESSED OR HOPELESS: 1

## 2023-12-07 NOTE — ASSESSMENT & PLAN NOTE
Instructed pt to try medication at bedtime before taking during waking hours to experiment and see if med will make them drowsy. If med makes them drowsy then pt is to avoid driving, avoid operating heavy machinery, or any other activity that requires alertness. Muscle Spasm  Instructed pt to try Icy Hot/or similar as needed/directed on package. Instructed to dampen a wash cloth, place into a ziplock bag, remove air and seal the bag. Place in microwave for 15-20 seconds. Careful it will be hot. Place a pillow case over the affected area, apply bag. This will administer moist heat to help relax muscle. Instructed pt to take extreme caution as this technique could cause a burn. Instructed pt not to use Icy Hot and moist heat together as this may intensify the therapy and cause burns. Pt verb understanding. Instructed pt to avoid taking a muscle relaxant while working or operating heavy machinery as this medication could cause drowsiness. Patient verbalized understanding.

## 2023-12-07 NOTE — ASSESSMENT & PLAN NOTE
[facility] from [facility] with diagnoses of major depressive disorder, recurrent episode, severe; IRINEO; alcohol use disorder, severe; posttraumatic stress syndrome. Patient was given refills for his medications for 2 weeks from the inpatient provider. Patient does not have an appointment with psychiatry until 1/6/2024 at which she will run out of his medications prior to this appointment.     Will refill prescription to hold patient until his initial appointment with [facility] at [facility]

## 2023-12-27 ENCOUNTER — TELEPHONE (OUTPATIENT)
Age: 48
End: 2023-12-27

## 2023-12-27 NOTE — TELEPHONE ENCOUNTER
----- Message from Nida Escalante sent at 12/27/2023 10:33 AM EST -----  Subject: Message to Provider    QUESTIONS  Information for Provider? carlos from behavioral health called shes been   trying to get medical records for this pt, she has sent a fax over, please   call her back if you have not received and if theres any issues, call   carlos [phone]  ---------------------------------------------------------------------------  --------------  CALL BACK INFO  [phone]; OK to leave message on voicemail  ---------------------------------------------------------------------------  --------------  SCRIPT ANSWERS  Relationship to Patient? Covered Entity  Covered Entity Type? Other  Other Covered Entity Type? behavioral health  Representative Name? carlos

## 2024-01-03 NOTE — TELEPHONE ENCOUNTER
Did we refer the patient?  If so the last office note is all that we are able to send from the office with a signed release from patient.  Any more than that needs to go to [facility] and they send appropriate information.

## 2024-01-10 DIAGNOSIS — F43.10 PTSD (POST-TRAUMATIC STRESS DISORDER): ICD-10-CM

## 2024-01-10 DIAGNOSIS — F41.1 GAD (GENERALIZED ANXIETY DISORDER): ICD-10-CM

## 2024-01-10 DIAGNOSIS — F33.3 SEVERE EPISODE OF RECURRENT MAJOR DEPRESSIVE DISORDER, WITH PSYCHOTIC FEATURES (HCC): ICD-10-CM

## 2024-01-10 RX ORDER — TRAZODONE HYDROCHLORIDE 50 MG/1
50 TABLET ORAL NIGHTLY
Qty: 30 TABLET | Refills: 0 | OUTPATIENT
Start: 2024-01-10

## 2024-01-10 RX ORDER — QUETIAPINE FUMARATE 100 MG/1
100 TABLET, FILM COATED ORAL 3 TIMES DAILY
Qty: 90 TABLET | Refills: 0 | OUTPATIENT
Start: 2024-01-10

## 2024-01-10 NOTE — TELEPHONE ENCOUNTER
I refilled these medications for a 1 time fill as he was going to be out of meds before he saw his psych 1/6/2024. He needs to contact his psych for refills. Thank you
